# Patient Record
Sex: MALE | Race: WHITE | HISPANIC OR LATINO | Employment: FULL TIME | URBAN - METROPOLITAN AREA
[De-identification: names, ages, dates, MRNs, and addresses within clinical notes are randomized per-mention and may not be internally consistent; named-entity substitution may affect disease eponyms.]

---

## 2023-08-09 ENCOUNTER — HOSPITAL ENCOUNTER (INPATIENT)
Facility: MEDICAL CENTER | Age: 58
LOS: 4 days | DRG: 115 | End: 2023-08-13
Attending: EMERGENCY MEDICINE | Admitting: HOSPITALIST
Payer: COMMERCIAL

## 2023-08-09 ENCOUNTER — APPOINTMENT (OUTPATIENT)
Dept: RADIOLOGY | Facility: MEDICAL CENTER | Age: 58
DRG: 115 | End: 2023-08-09
Attending: HOSPITALIST
Payer: COMMERCIAL

## 2023-08-09 DIAGNOSIS — H54.61 VISION LOSS, RIGHT EYE: ICD-10-CM

## 2023-08-09 PROBLEM — Z79.4 TYPE 2 DIABETES MELLITUS, WITH LONG-TERM CURRENT USE OF INSULIN (HCC): Status: ACTIVE | Noted: 2023-08-09

## 2023-08-09 PROBLEM — I10 ESSENTIAL HYPERTENSION: Status: ACTIVE | Noted: 2023-08-09

## 2023-08-09 PROBLEM — E11.9 TYPE 2 DIABETES MELLITUS, WITH LONG-TERM CURRENT USE OF INSULIN (HCC): Status: ACTIVE | Noted: 2023-08-09

## 2023-08-09 PROBLEM — E78.5 DYSLIPIDEMIA: Status: ACTIVE | Noted: 2023-08-09

## 2023-08-09 LAB
ANION GAP SERPL CALC-SCNC: 13 MMOL/L (ref 7–16)
BASOPHILS # BLD AUTO: 0.4 % (ref 0–1.8)
BASOPHILS # BLD: 0.06 K/UL (ref 0–0.12)
BUN SERPL-MCNC: 15 MG/DL (ref 8–22)
CALCIUM SERPL-MCNC: 10.1 MG/DL (ref 8.5–10.5)
CHLORIDE SERPL-SCNC: 98 MMOL/L (ref 96–112)
CO2 SERPL-SCNC: 22 MMOL/L (ref 20–33)
CREAT SERPL-MCNC: 1.06 MG/DL (ref 0.5–1.4)
CRP SERPL HS-MCNC: 1.61 MG/DL (ref 0–0.75)
EOSINOPHIL # BLD AUTO: 0.3 K/UL (ref 0–0.51)
EOSINOPHIL NFR BLD: 2.2 % (ref 0–6.9)
ERYTHROCYTE [DISTWIDTH] IN BLOOD BY AUTOMATED COUNT: 47.1 FL (ref 35.9–50)
ERYTHROCYTE [SEDIMENTATION RATE] IN BLOOD BY WESTERGREN METHOD: 21 MM/HOUR (ref 0–20)
GFR SERPLBLD CREATININE-BSD FMLA CKD-EPI: 81 ML/MIN/1.73 M 2
GLUCOSE BLD STRIP.AUTO-MCNC: 156 MG/DL (ref 65–99)
GLUCOSE BLD STRIP.AUTO-MCNC: 303 MG/DL (ref 65–99)
GLUCOSE SERPL-MCNC: 111 MG/DL (ref 65–99)
HCT VFR BLD AUTO: 41.8 % (ref 42–52)
HGB BLD-MCNC: 13.5 G/DL (ref 14–18)
IMM GRANULOCYTES # BLD AUTO: 0.13 K/UL (ref 0–0.11)
IMM GRANULOCYTES NFR BLD AUTO: 1 % (ref 0–0.9)
LYMPHOCYTES # BLD AUTO: 2.26 K/UL (ref 1–4.8)
LYMPHOCYTES NFR BLD: 16.9 % (ref 22–41)
MCH RBC QN AUTO: 30.3 PG (ref 27–33)
MCHC RBC AUTO-ENTMCNC: 32.3 G/DL (ref 32.3–36.5)
MCV RBC AUTO: 93.7 FL (ref 81.4–97.8)
MONOCYTES # BLD AUTO: 0.95 K/UL (ref 0–0.85)
MONOCYTES NFR BLD AUTO: 7.1 % (ref 0–13.4)
NEUTROPHILS # BLD AUTO: 9.67 K/UL (ref 1.82–7.42)
NEUTROPHILS NFR BLD: 72.4 % (ref 44–72)
NRBC # BLD AUTO: 0 K/UL
NRBC BLD-RTO: 0 /100 WBC (ref 0–0.2)
PLATELET # BLD AUTO: 387 K/UL (ref 164–446)
PMV BLD AUTO: 10 FL (ref 9–12.9)
POTASSIUM SERPL-SCNC: 4.5 MMOL/L (ref 3.6–5.5)
RBC # BLD AUTO: 4.46 M/UL (ref 4.7–6.1)
SODIUM SERPL-SCNC: 133 MMOL/L (ref 135–145)
WBC # BLD AUTO: 13.4 K/UL (ref 4.8–10.8)

## 2023-08-09 PROCEDURE — 86362 MOG-IGG1 ANTB CBA EACH: CPT

## 2023-08-09 PROCEDURE — 700105 HCHG RX REV CODE 258: Performed by: EMERGENCY MEDICINE

## 2023-08-09 PROCEDURE — 70496 CT ANGIOGRAPHY HEAD: CPT

## 2023-08-09 PROCEDURE — 80048 BASIC METABOLIC PNL TOTAL CA: CPT

## 2023-08-09 PROCEDURE — 96365 THER/PROPH/DIAG IV INF INIT: CPT

## 2023-08-09 PROCEDURE — 99222 1ST HOSP IP/OBS MODERATE 55: CPT | Performed by: PSYCHIATRY & NEUROLOGY

## 2023-08-09 PROCEDURE — 85652 RBC SED RATE AUTOMATED: CPT

## 2023-08-09 PROCEDURE — 85025 COMPLETE CBC W/AUTO DIFF WBC: CPT

## 2023-08-09 PROCEDURE — 86051 AQUAPORIN-4 ANTB ELISA: CPT

## 2023-08-09 PROCEDURE — 700111 HCHG RX REV CODE 636 W/ 250 OVERRIDE (IP): Mod: JZ | Performed by: EMERGENCY MEDICINE

## 2023-08-09 PROCEDURE — 82962 GLUCOSE BLOOD TEST: CPT | Mod: 91

## 2023-08-09 PROCEDURE — 770006 HCHG ROOM/CARE - MED/SURG/GYN SEMI*

## 2023-08-09 PROCEDURE — 70498 CT ANGIOGRAPHY NECK: CPT

## 2023-08-09 PROCEDURE — 700102 HCHG RX REV CODE 250 W/ 637 OVERRIDE(OP): Performed by: HOSPITALIST

## 2023-08-09 PROCEDURE — 99223 1ST HOSP IP/OBS HIGH 75: CPT | Mod: AI | Performed by: HOSPITALIST

## 2023-08-09 PROCEDURE — 700117 HCHG RX CONTRAST REV CODE 255: Performed by: HOSPITALIST

## 2023-08-09 PROCEDURE — 86140 C-REACTIVE PROTEIN: CPT

## 2023-08-09 PROCEDURE — 36415 COLL VENOUS BLD VENIPUNCTURE: CPT

## 2023-08-09 PROCEDURE — 99285 EMERGENCY DEPT VISIT HI MDM: CPT

## 2023-08-09 PROCEDURE — A9270 NON-COVERED ITEM OR SERVICE: HCPCS | Performed by: HOSPITALIST

## 2023-08-09 RX ORDER — BISACODYL 10 MG
10 SUPPOSITORY, RECTAL RECTAL
Status: DISCONTINUED | OUTPATIENT
Start: 2023-08-09 | End: 2023-08-13 | Stop reason: HOSPADM

## 2023-08-09 RX ORDER — PROMETHAZINE HYDROCHLORIDE 25 MG/1
12.5-25 TABLET ORAL EVERY 4 HOURS PRN
Status: DISCONTINUED | OUTPATIENT
Start: 2023-08-09 | End: 2023-08-13 | Stop reason: HOSPADM

## 2023-08-09 RX ORDER — OLMESARTAN MEDOXOMIL 20 MG/1
20 TABLET ORAL NIGHTLY
Status: DISCONTINUED | OUTPATIENT
Start: 2023-08-09 | End: 2023-08-13 | Stop reason: HOSPADM

## 2023-08-09 RX ORDER — OLMESARTAN MEDOXOMIL 20 MG/1
20 TABLET ORAL NIGHTLY
COMMUNITY

## 2023-08-09 RX ORDER — SPIRONOLACTONE 25 MG/1
37.5 TABLET ORAL
Status: DISCONTINUED | OUTPATIENT
Start: 2023-08-10 | End: 2023-08-13 | Stop reason: HOSPADM

## 2023-08-09 RX ORDER — DEXTROSE MONOHYDRATE 25 G/50ML
25 INJECTION, SOLUTION INTRAVENOUS
Status: DISCONTINUED | OUTPATIENT
Start: 2023-08-09 | End: 2023-08-10

## 2023-08-09 RX ORDER — ASPIRIN 81 MG/1
81 TABLET ORAL
COMMUNITY

## 2023-08-09 RX ORDER — INSULIN HUMAN 500 [IU]/ML
INJECTION, SOLUTION SUBCUTANEOUS
Status: ON HOLD | COMMUNITY
Start: 2023-06-12 | End: 2023-08-13

## 2023-08-09 RX ORDER — AMOXICILLIN 250 MG
2 CAPSULE ORAL 2 TIMES DAILY
Status: DISCONTINUED | OUTPATIENT
Start: 2023-08-09 | End: 2023-08-13 | Stop reason: HOSPADM

## 2023-08-09 RX ORDER — ONDANSETRON 4 MG/1
4 TABLET, ORALLY DISINTEGRATING ORAL EVERY 4 HOURS PRN
Status: DISCONTINUED | OUTPATIENT
Start: 2023-08-09 | End: 2023-08-13 | Stop reason: HOSPADM

## 2023-08-09 RX ORDER — ROSUVASTATIN CALCIUM 5 MG/1
5 TABLET, COATED ORAL NIGHTLY
COMMUNITY
Start: 2023-05-22

## 2023-08-09 RX ORDER — INSULIN LISPRO 100 [IU]/ML
3-14 INJECTION, SOLUTION INTRAVENOUS; SUBCUTANEOUS
Status: DISCONTINUED | OUTPATIENT
Start: 2023-08-09 | End: 2023-08-10

## 2023-08-09 RX ORDER — INSULIN LISPRO 100 [IU]/ML
0.2 INJECTION, SOLUTION INTRAVENOUS; SUBCUTANEOUS
Status: DISCONTINUED | OUTPATIENT
Start: 2023-08-10 | End: 2023-08-10

## 2023-08-09 RX ORDER — ASPIRIN 81 MG/1
81 TABLET ORAL DAILY
Status: DISCONTINUED | OUTPATIENT
Start: 2023-08-10 | End: 2023-08-13 | Stop reason: HOSPADM

## 2023-08-09 RX ORDER — POLYETHYLENE GLYCOL 3350 17 G/17G
1 POWDER, FOR SOLUTION ORAL
Status: DISCONTINUED | OUTPATIENT
Start: 2023-08-09 | End: 2023-08-13 | Stop reason: HOSPADM

## 2023-08-09 RX ORDER — PROMETHAZINE HYDROCHLORIDE 25 MG/1
12.5-25 SUPPOSITORY RECTAL EVERY 4 HOURS PRN
Status: DISCONTINUED | OUTPATIENT
Start: 2023-08-09 | End: 2023-08-13 | Stop reason: HOSPADM

## 2023-08-09 RX ORDER — PROCHLORPERAZINE EDISYLATE 5 MG/ML
5-10 INJECTION INTRAMUSCULAR; INTRAVENOUS EVERY 4 HOURS PRN
Status: DISCONTINUED | OUTPATIENT
Start: 2023-08-09 | End: 2023-08-13 | Stop reason: HOSPADM

## 2023-08-09 RX ORDER — METHYLPREDNISOLONE SODIUM SUCCINATE 40 MG/ML
1000 INJECTION, POWDER, LYOPHILIZED, FOR SOLUTION INTRAMUSCULAR; INTRAVENOUS DAILY
Status: DISCONTINUED | OUTPATIENT
Start: 2023-08-10 | End: 2023-08-09

## 2023-08-09 RX ORDER — ACETAMINOPHEN 325 MG/1
650 TABLET ORAL EVERY 6 HOURS PRN
Status: DISCONTINUED | OUTPATIENT
Start: 2023-08-09 | End: 2023-08-13 | Stop reason: HOSPADM

## 2023-08-09 RX ORDER — ROSUVASTATIN CALCIUM 5 MG/1
5 TABLET, COATED ORAL NIGHTLY
Status: DISCONTINUED | OUTPATIENT
Start: 2023-08-09 | End: 2023-08-13 | Stop reason: HOSPADM

## 2023-08-09 RX ORDER — LABETALOL HYDROCHLORIDE 5 MG/ML
10 INJECTION, SOLUTION INTRAVENOUS EVERY 4 HOURS PRN
Status: DISCONTINUED | OUTPATIENT
Start: 2023-08-09 | End: 2023-08-13 | Stop reason: HOSPADM

## 2023-08-09 RX ORDER — CARVEDILOL 25 MG/1
25 TABLET ORAL 2 TIMES DAILY
COMMUNITY

## 2023-08-09 RX ORDER — CARVEDILOL 6.25 MG/1
25 TABLET ORAL 2 TIMES DAILY
Status: DISCONTINUED | OUTPATIENT
Start: 2023-08-09 | End: 2023-08-13 | Stop reason: HOSPADM

## 2023-08-09 RX ORDER — ONDANSETRON 2 MG/ML
4 INJECTION INTRAMUSCULAR; INTRAVENOUS EVERY 4 HOURS PRN
Status: DISCONTINUED | OUTPATIENT
Start: 2023-08-09 | End: 2023-08-13 | Stop reason: HOSPADM

## 2023-08-09 RX ORDER — SPIRONOLACTONE 25 MG/1
37.5 TABLET ORAL
COMMUNITY
Start: 2023-06-27

## 2023-08-09 RX ADMIN — ROSUVASTATIN CALCIUM 5 MG: 5 TABLET, FILM COATED ORAL at 20:39

## 2023-08-09 RX ADMIN — CARVEDILOL 25 MG: 6.25 TABLET, FILM COATED ORAL at 18:06

## 2023-08-09 RX ADMIN — INSULIN LISPRO 10 UNITS: 100 INJECTION, SOLUTION INTRAVENOUS; SUBCUTANEOUS at 20:38

## 2023-08-09 RX ADMIN — SODIUM CHLORIDE 1000 MG: 9 INJECTION, SOLUTION INTRAVENOUS at 15:15

## 2023-08-09 RX ADMIN — IOHEXOL 60 ML: 350 INJECTION, SOLUTION INTRAVENOUS at 22:29

## 2023-08-09 RX ADMIN — INSULIN GLARGINE-YFGN 26 UNITS: 100 INJECTION, SOLUTION SUBCUTANEOUS at 18:04

## 2023-08-09 RX ADMIN — OLMESARTAN MEDOXOMIL 20 MG: 20 TABLET, FILM COATED ORAL at 20:39

## 2023-08-09 ASSESSMENT — COGNITIVE AND FUNCTIONAL STATUS - GENERAL
SUGGESTED CMS G CODE MODIFIER DAILY ACTIVITY: CH
DAILY ACTIVITIY SCORE: 24
SUGGESTED CMS G CODE MODIFIER MOBILITY: CH
MOBILITY SCORE: 24

## 2023-08-09 ASSESSMENT — LIFESTYLE VARIABLES
ON A TYPICAL DAY WHEN YOU DRINK ALCOHOL HOW MANY DRINKS DO YOU HAVE: 1
CONSUMPTION TOTAL: NEGATIVE
EVER FELT BAD OR GUILTY ABOUT YOUR DRINKING: NO
ALCOHOL_USE: YES
TOTAL SCORE: 0
EVER HAD A DRINK FIRST THING IN THE MORNING TO STEADY YOUR NERVES TO GET RID OF A HANGOVER: NO
AVERAGE NUMBER OF DAYS PER WEEK YOU HAVE A DRINK CONTAINING ALCOHOL: 5
HAVE PEOPLE ANNOYED YOU BY CRITICIZING YOUR DRINKING: NO
HAVE YOU EVER FELT YOU SHOULD CUT DOWN ON YOUR DRINKING: NO
TOTAL SCORE: 0
HOW MANY TIMES IN THE PAST YEAR HAVE YOU HAD 5 OR MORE DRINKS IN A DAY: 0
DOES PATIENT WANT TO STOP DRINKING: NO
TOTAL SCORE: 0

## 2023-08-09 ASSESSMENT — PAIN DESCRIPTION - PAIN TYPE
TYPE: ACUTE PAIN
TYPE: ACUTE PAIN

## 2023-08-09 ASSESSMENT — PATIENT HEALTH QUESTIONNAIRE - PHQ9
SUM OF ALL RESPONSES TO PHQ9 QUESTIONS 1 AND 2: 0
1. LITTLE INTEREST OR PLEASURE IN DOING THINGS: NOT AT ALL
2. FEELING DOWN, DEPRESSED, IRRITABLE, OR HOPELESS: NOT AT ALL

## 2023-08-09 ASSESSMENT — FIBROSIS 4 INDEX
FIB4 SCORE: 0.79
FIB4 SCORE: 0.67

## 2023-08-09 NOTE — ED TRIAGE NOTES
Chief Complaint   Patient presents with    Loss of Vision     Complete loss of vision to R eye since 7/27/23.  Pt sent to ED by eye doctor

## 2023-08-09 NOTE — ED PROVIDER NOTES
ER Provider Note    Scribed for Young Najera M.d. by Maurilio Romo M.D.. 8/9/2023  1:59 PM    Primary Care Provider: Pcp Pt States None    CHIEF COMPLAINT  Chief Complaint   Patient presents with    Loss of Vision     Complete loss of vision to R eye since 7/27/23.  Pt sent to ED by eye doctor     EXTERNAL RECORDS REVIEWED  Outside hospital records show the patient was seen in a different emergency department on July 29, for right-sided vision loss, and referred to a retinal specialist, who wanted him to have an MRI within 7 days.  On August 1, he represented to the emergency department, believing he was told to go there for stat imaging.  This was negative for optic neuritis, but there was a finding of a white matter lesion measuring 1.2 x 0.8 x 1.2 cm in the mid frontal lobe.  This raise concern for chronic demyelinating plaque.  This triggered a referral to neurology.  A review of the August 1 emergency department visit confirms a neurology referral.  Emergency department visit on August 1 shows that the provider consulted the on-call neurologist, reviewed the MRI findings, and neurology recommended referral to the outpatient clinic, Dr. Ferrera.  The emergency department provider spoke again with [facility], who wanted to see the patient the following morning, August 2, at 8 AM.  There is also a documented primary care follow-up visit on August 3. He also has an order for outpatient carotid Doppler.  ESR and CRP were ordered as outpatient studies as well.  I do not see that the studies have resulted.    HPI/ROS  LIMITATION TO HISTORY       OUTSIDE HISTORIAN(S):  Spouse at bedside contributes to history.    Ace Curry is a 58 y.o. male who presents to the ED complaining of R eye vision loss onset almost 2 weeks ago, on July 27. He says that he was seen again today by Dr. Torres from [facility], and says he was told to come here to get a carotid Doppler and a temporal  artery biopsy.  He denies trauma, fevers or chills, chest pain or cough.  He reports a history of diabetes, hypertension, hyperlipidemia.  BMI is elevated.  He certainly has stroke risk factors.  He says that late last month, he had some slight haziness in his right eye, which has been slowly progressive, then more rapidly progressive over the past 24 to 48 hours, and in combination with his evaluation today, resulted in his emergent referral to the emergency department, per his report.  He does not complain of confusion, sensation changes, weakness, ataxia, or any febrile or respiratory illness.  No prior similar vision issues.      PAST MEDICAL HISTORY  History reviewed. No pertinent past medical history.    SURGICAL HISTORY  No past surgical history on file.    FAMILY HISTORY  No family history on file.    SOCIAL HISTORY       CURRENT MEDICATIONS  Current Discharge Medication List        CONTINUE these medications which have NOT CHANGED    Details   spironolactone (ALDACTONE) 25 MG Tab Take 37.5 mg by mouth every day. 37.5mg = 1 and 1/2 tab      rosuvastatin (CRESTOR) 5 MG Tab Take 5 mg by mouth every evening.      Insulin Regular Human, Conc, (HUMULIN R U-500 KWIKPEN) 500 UNIT/ML Solution Pen-injector INJECT  UNITS AS PER SLIDING SCALE SUBCUTANEOUSLY 2 TIMES DAILY BEFORE MEALS FOR  UNITS PER DAY      Cyanocobalamin (VITAMIN B 12 PO) Take 1 Tablet by mouth every day.      VITAMIN D PO Take 1 Tablet by mouth every day.      MAGNESIUM PO Take 1 Tablet by mouth every day.      NON SPECIFIED Take 1 Tablet by mouth every day. Emu- helps with inflamation      aspirin (ASPIRIN 81) 81 MG EC tablet Take 81 mg by mouth every day.      carvedilol (COREG) 25 MG Tab Take 25 mg by mouth 2 times a day.      olmesartan (BENICAR) 20 MG Tab Take 20 mg by mouth every evening.             ALLERGIES  Patient has no known allergies.    PHYSICAL EXAM  VITAL SIGNS: /61   Pulse 69   Temp 36.2 °C (97.1 °F) (Temporal)   " Resp 17   Ht 1.88 m (6' 2\")   Wt (!) 132 kg (290 lb 9.1 oz)   SpO2 93%   BMI 37.31 kg/m²   Pulse ox interpretation: I interpret this pulse ox as normal.  Constitutional: Alert in no apparent distress.  HENT: No signs of trauma, Bilateral external ears normal, Nose normal.   Eyes: Conjunctiva normal, Non-icteric.   Neck: Normal range of motion, Supple, No stridor.   Lymphatic: No lymphadenopathy noted.   Cardiovascular: Regular rate and rhythm, no murmurs.   Thorax & Lungs: Normal breath sounds, No respiratory distress, No wheezing  Abdomen: Bowel sounds normal, Soft, No tenderness, No masses, No pulsatile masses. No peritoneal signs.  Skin: Warm, Dry, No erythema, No rash.   Extremities: Intact distal pulses, No edema, No cyanosis.  Musculoskeletal: Good range of motion in all major joints. No or major deformities noted.   Neurologic: Alert , Normal motor function, Normal sensory function, No focal deficits noted.   Psychiatric: Affect normal, Judgment normal, Mood normal.     DIAGNOSTIC STUDIES    Labs:   Labs Reviewed   CBC WITH DIFFERENTIAL - Abnormal; Notable for the following components:       Result Value    WBC 13.4 (*)     RBC 4.46 (*)     Hemoglobin 13.5 (*)     Hematocrit 41.8 (*)     Neutrophils-Polys 72.40 (*)     Lymphocytes 16.90 (*)     Immature Granulocytes 1.00 (*)     Neutrophils (Absolute) 9.67 (*)     Monos (Absolute) 0.95 (*)     Immature Granulocytes (abs) 0.13 (*)     All other components within normal limits   BASIC METABOLIC PANEL - Abnormal; Notable for the following components:    Sodium 133 (*)     Glucose 111 (*)     All other components within normal limits   SED RATE - Abnormal; Notable for the following components:    Sed Rate Westergren 21 (*)     All other components within normal limits   CRP QUANTITIVE (NON-CARDIAC) - Abnormal; Notable for the following components:    Stat C-Reactive Protein 1.61 (*)     All other components within normal limits   CBC WITH DIFFERENTIAL - " Abnormal; Notable for the following components:    WBC 14.6 (*)     RBC 4.49 (*)     Hematocrit 41.2 (*)     Neutrophils-Polys 89.50 (*)     Lymphocytes 8.60 (*)     Immature Granulocytes 1.20 (*)     Neutrophils (Absolute) 13.04 (*)     Immature Granulocytes (abs) 0.18 (*)     All other components within normal limits   BASIC METABOLIC PANEL - Abnormal; Notable for the following components:    Sodium 129 (*)     Co2 16 (*)     Glucose 288 (*)     Anion Gap 17.0 (*)     All other components within normal limits   BASIC METABOLIC PANEL - Abnormal; Notable for the following components:    Sodium 130 (*)     Co2 17 (*)     Glucose 378 (*)     Bun 27 (*)     Anion Gap 17.0 (*)     All other components within normal limits   CBC WITH DIFFERENTIAL - Abnormal; Notable for the following components:    WBC 18.8 (*)     RBC 4.39 (*)     Hemoglobin 13.4 (*)     Hematocrit 40.7 (*)     Neutrophils-Polys 88.50 (*)     Lymphocytes 6.40 (*)     Immature Granulocytes 1.50 (*)     Neutrophils (Absolute) 16.67 (*)     Immature Granulocytes (abs) 0.28 (*)     All other components within normal limits   BASIC METABOLIC PANEL - Abnormal; Notable for the following components:    Sodium 132 (*)     Co2 18 (*)     Glucose 376 (*)     Bun 28 (*)     All other components within normal limits   POCT GLUCOSE DEVICE RESULTS - Abnormal; Notable for the following components:    POC Glucose, Blood 156 (*)     All other components within normal limits   POCT GLUCOSE DEVICE RESULTS - Abnormal; Notable for the following components:    POC Glucose, Blood 303 (*)     All other components within normal limits   POCT GLUCOSE DEVICE RESULTS - Abnormal; Notable for the following components:    POC Glucose, Blood 307 (*)     All other components within normal limits   POCT GLUCOSE DEVICE RESULTS - Abnormal; Notable for the following components:    POC Glucose, Blood 331 (*)     All other components within normal limits   POCT GLUCOSE DEVICE RESULTS -  Abnormal; Notable for the following components:    POC Glucose, Blood 324 (*)     All other components within normal limits   POCT GLUCOSE DEVICE RESULTS - Abnormal; Notable for the following components:    POC Glucose, Blood 389 (*)     All other components within normal limits   POCT GLUCOSE DEVICE RESULTS - Abnormal; Notable for the following components:    POC Glucose, Blood 403 (*)     All other components within normal limits   POCT GLUCOSE DEVICE RESULTS - Abnormal; Notable for the following components:    POC Glucose, Blood 364 (*)     All other components within normal limits   POCT GLUCOSE DEVICE RESULTS - Abnormal; Notable for the following components:    POC Glucose, Blood 377 (*)     All other components within normal limits   ESTIMATED GFR   ESTIMATED GFR   BETA-HYDROXYBUTYRIC ACID   PROTHROMBIN TIME    Narrative:     Indicate which anticoagulants the patient is on:->NONE   APTT    Narrative:     Indicate which anticoagulants the patient is on:->NONE   ESTIMATED GFR   ESTIMATED GFR   MOG IGG W/RFLX TITER,SERUM   AQUAPORIN-4 RECEPTOR AB   CSF CELL COUNT   MENINGITIS/ENCEPHALITIS CSF PANEL BY PCR   CSF GLUCOSE   CSF PROTEIN   CSF CULTURE   OLIGOCLONAL BANDS SERUM/CSF   PROTEIN ELECTROPHORESIS CSF   TREPONEMA PALLIDUM ANTIBODY, IGG BY IFA (CSF)   SED RATE       EKG:   I have independently interpreted this EKG  No results found for this or any previous visit.    Radiology:   The attending emergency physician has independently interpreted the diagnostic imaging associated with this visit and am waiting the final reading from the radiologist.   Preliminary interpretation is a follows: Outside hospital CT imaging reports reviewed, and are unremarkable.  Radiologist interpretation:     US-TEMPORAL ARTERY DUPLEX BILAT   Final Result      CT-CTA NECK WITH & W/O-POST PROCESSING   Final Result      CT angiogram of the neck within normal limits.      CT-CTA HEAD WITH & W/O-POST PROCESS   Final Result      CT  angiogram of the [place] within normal limits.      MR-BRAIN-WITH & W/O    (Results Pending)       COURSE & MEDICAL DECISION MAKING     ED Observation Status? No; Patient does not meet criteria for ED Observation.     INITIAL ASSESSMENT, COURSE AND PLAN  Care Narrative:       1:59 PM Patient presents to the ED with progressive and then precipitous vision loss in the right eye.  He has had extensive imaging, with no obvious findings except for some concerning demyelinating lesions.  This was on an MRI of the orbits, but no dedicated MRI of the head has been performed.  He says he was referred directly to the emergency department from [facility], so I will try to contact them for collateral information.    2:27 PM Jacques Torres at [facility] reports to me during our phone conversation that he spoke with Dr. Gomez, neuro-ophthalmology here at [facility], who recommend the patient come to the emergency department.  This did not end up going through the transfer center, so no other information was available until our phone conversation.  We discussed the concerns about the broad differential for this patient, including optic neuritis, ischemic optic neuritis, demyelinating lesions, giant cell arteritis, CVA.  I will speak with Dr. Keith juárez, if possible for collateral.    2:48 PM Dr. Guerra manager, neuro-ophthalmology, returned my page and we discussed the patient's imaging to date, his conversations with [facility] its, and his recommendation for admission for general neurology consult to broaden the differential, but also that we initiate high-dose steroid therapy in case of giant cell arteritis.  I have submitted a bed request.  Of also ordered a gram of Solu-Medrol.    3:16 PM Dr. Bruno Gomez, the hospitalist, came to discuss the patient with me.  We reviewed the somewhat complicated course so far, beginning with the outside emergency department evaluations, as well as  [facility] evaluations, and Dr. Calvert's recommendations.  The patient will be admitted to the hospitalist service.      ADDITIONAL PROBLEM LIST  Diabetes, hypertension, hyperlipidemia.    DISPOSITION AND DISCUSSIONS  I have discussed management of the patient with the following physicians and SEDA's: The APRN at [facility], Dr. Calvert from neuro-ophthalmology, and the admitting hospitalist, as above.    DISPOSITION:  Patient will be admitted to the hospitalist service in guarded condition.      FINAL DIAGNOSIS  1. Vision loss, right eye      CRITICAL CARE  The very real possibilty of a deterioration of this patient's condition required the highest level of my preparedness for sudden, emergent intervention.  I provided critical care services, which included medication orders, frequent reevaluations of the patient's condition and response to treatment, ordering and reviewing test results, and discussing the case with various consultants.  The critical care time associated with the care of the patient was 39 minutes. Review chart for interventions. This time is exclusive of any other billable procedures.         IMaurilio M.D. (Mignon), am scribing for, and in the presence of, Maurilio Romo M.D..    Electronically signed by: Maurilio Romo M.D. (Duyibe), 8/9/2023    Maurilio SPENCER M.D. personally performed the services described in this documentation, as scribed by Maurilio Romo M.D. in my presence, and it is both accurate and complete.

## 2023-08-09 NOTE — PROGRESS NOTES
Pt transferred to [facility] with transport via gurney. Upon arrival pt ambulated to bathroom with no assistance and no complications. Patient and wife informed on visitor policy. Both parties agreeable on POC.

## 2023-08-09 NOTE — ED NOTES
Eye Procedures OS (Left Eye): 20/50  OD (Right Eye): greater than 20/200  OU (Both Eyes): 20/50

## 2023-08-09 NOTE — ED NOTES
Med Rec complete per patient and med list at bedside   Allergies reviewed  No oral antibiotics in the last 30 days  Preferred pharmacy: [facility] in [place]    Ozempic 1 mg 1x/week was last injected 10 days ago and pt states his eye doctor advised him to stop taking it because of concerns pt's symptom could be affected by medication

## 2023-08-10 ENCOUNTER — APPOINTMENT (OUTPATIENT)
Dept: RADIOLOGY | Facility: MEDICAL CENTER | Age: 58
DRG: 115 | End: 2023-08-10
Attending: HOSPITALIST
Payer: COMMERCIAL

## 2023-08-10 LAB
ANION GAP SERPL CALC-SCNC: 17 MMOL/L (ref 7–16)
ANION GAP SERPL CALC-SCNC: 17 MMOL/L (ref 7–16)
APTT PPP: 28.6 SEC (ref 24.7–36)
B-OH-BUTYR SERPL-MCNC: 0.17 MMOL/L (ref 0.02–0.27)
BASOPHILS # BLD AUTO: 0.1 % (ref 0–1.8)
BASOPHILS # BLD: 0.02 K/UL (ref 0–0.12)
BUN SERPL-MCNC: 20 MG/DL (ref 8–22)
BUN SERPL-MCNC: 27 MG/DL (ref 8–22)
CALCIUM SERPL-MCNC: 9.8 MG/DL (ref 8.5–10.5)
CALCIUM SERPL-MCNC: 9.9 MG/DL (ref 8.5–10.5)
CHLORIDE SERPL-SCNC: 96 MMOL/L (ref 96–112)
CHLORIDE SERPL-SCNC: 96 MMOL/L (ref 96–112)
CO2 SERPL-SCNC: 16 MMOL/L (ref 20–33)
CO2 SERPL-SCNC: 17 MMOL/L (ref 20–33)
CREAT SERPL-MCNC: 1.24 MG/DL (ref 0.5–1.4)
CREAT SERPL-MCNC: 1.29 MG/DL (ref 0.5–1.4)
EOSINOPHIL # BLD AUTO: 0.01 K/UL (ref 0–0.51)
EOSINOPHIL NFR BLD: 0.1 % (ref 0–6.9)
ERYTHROCYTE [DISTWIDTH] IN BLOOD BY AUTOMATED COUNT: 45.9 FL (ref 35.9–50)
GFR SERPLBLD CREATININE-BSD FMLA CKD-EPI: 64 ML/MIN/1.73 M 2
GFR SERPLBLD CREATININE-BSD FMLA CKD-EPI: 67 ML/MIN/1.73 M 2
GLUCOSE BLD STRIP.AUTO-MCNC: 307 MG/DL (ref 65–99)
GLUCOSE BLD STRIP.AUTO-MCNC: 324 MG/DL (ref 65–99)
GLUCOSE BLD STRIP.AUTO-MCNC: 331 MG/DL (ref 65–99)
GLUCOSE BLD STRIP.AUTO-MCNC: 389 MG/DL (ref 65–99)
GLUCOSE BLD STRIP.AUTO-MCNC: 403 MG/DL (ref 65–99)
GLUCOSE SERPL-MCNC: 288 MG/DL (ref 65–99)
GLUCOSE SERPL-MCNC: 378 MG/DL (ref 65–99)
HCT VFR BLD AUTO: 41.2 % (ref 42–52)
HGB BLD-MCNC: 14 G/DL (ref 14–18)
IMM GRANULOCYTES # BLD AUTO: 0.18 K/UL (ref 0–0.11)
IMM GRANULOCYTES NFR BLD AUTO: 1.2 % (ref 0–0.9)
INR PPP: 1.08 (ref 0.87–1.13)
LYMPHOCYTES # BLD AUTO: 1.26 K/UL (ref 1–4.8)
LYMPHOCYTES NFR BLD: 8.6 % (ref 22–41)
MCH RBC QN AUTO: 31.2 PG (ref 27–33)
MCHC RBC AUTO-ENTMCNC: 34 G/DL (ref 32.3–36.5)
MCV RBC AUTO: 91.8 FL (ref 81.4–97.8)
MONOCYTES # BLD AUTO: 0.07 K/UL (ref 0–0.85)
MONOCYTES NFR BLD AUTO: 0.5 % (ref 0–13.4)
NEUTROPHILS # BLD AUTO: 13.04 K/UL (ref 1.82–7.42)
NEUTROPHILS NFR BLD: 89.5 % (ref 44–72)
NRBC # BLD AUTO: 0 K/UL
NRBC BLD-RTO: 0 /100 WBC (ref 0–0.2)
PLATELET # BLD AUTO: 387 K/UL (ref 164–446)
PMV BLD AUTO: 10.1 FL (ref 9–12.9)
POTASSIUM SERPL-SCNC: 4.6 MMOL/L (ref 3.6–5.5)
POTASSIUM SERPL-SCNC: 5.1 MMOL/L (ref 3.6–5.5)
PROTHROMBIN TIME: 13.9 SEC (ref 12–14.6)
RBC # BLD AUTO: 4.49 M/UL (ref 4.7–6.1)
SODIUM SERPL-SCNC: 129 MMOL/L (ref 135–145)
SODIUM SERPL-SCNC: 130 MMOL/L (ref 135–145)
WBC # BLD AUTO: 14.6 K/UL (ref 4.8–10.8)

## 2023-08-10 PROCEDURE — 82962 GLUCOSE BLOOD TEST: CPT | Mod: 91

## 2023-08-10 PROCEDURE — 36415 COLL VENOUS BLD VENIPUNCTURE: CPT

## 2023-08-10 PROCEDURE — 700105 HCHG RX REV CODE 258: Performed by: HOSPITALIST

## 2023-08-10 PROCEDURE — 80048 BASIC METABOLIC PNL TOTAL CA: CPT

## 2023-08-10 PROCEDURE — 85730 THROMBOPLASTIN TIME PARTIAL: CPT

## 2023-08-10 PROCEDURE — 93880 EXTRACRANIAL BILAT STUDY: CPT | Mod: 26 | Performed by: INTERNAL MEDICINE

## 2023-08-10 PROCEDURE — 770006 HCHG ROOM/CARE - MED/SURG/GYN SEMI*

## 2023-08-10 PROCEDURE — 700102 HCHG RX REV CODE 250 W/ 637 OVERRIDE(OP): Performed by: HOSPITALIST

## 2023-08-10 PROCEDURE — 85025 COMPLETE CBC W/AUTO DIFF WBC: CPT

## 2023-08-10 PROCEDURE — 700111 HCHG RX REV CODE 636 W/ 250 OVERRIDE (IP): Mod: JZ | Performed by: HOSPITALIST

## 2023-08-10 PROCEDURE — 93880 EXTRACRANIAL BILAT STUDY: CPT

## 2023-08-10 PROCEDURE — 85610 PROTHROMBIN TIME: CPT

## 2023-08-10 PROCEDURE — 83916 OLIGOCLONAL BANDS: CPT

## 2023-08-10 PROCEDURE — 82010 KETONE BODYS QUAN: CPT

## 2023-08-10 PROCEDURE — 99233 SBSQ HOSP IP/OBS HIGH 50: CPT | Mod: 25 | Performed by: INTERNAL MEDICINE

## 2023-08-10 PROCEDURE — A9270 NON-COVERED ITEM OR SERVICE: HCPCS | Performed by: HOSPITALIST

## 2023-08-10 RX ORDER — INSULIN LISPRO 100 [IU]/ML
3-14 INJECTION, SOLUTION INTRAVENOUS; SUBCUTANEOUS
Status: DISCONTINUED | OUTPATIENT
Start: 2023-08-11 | End: 2023-08-11

## 2023-08-10 RX ORDER — INSULIN LISPRO 100 [IU]/ML
3-14 INJECTION, SOLUTION INTRAVENOUS; SUBCUTANEOUS
Status: DISCONTINUED | OUTPATIENT
Start: 2023-08-10 | End: 2023-08-10

## 2023-08-10 RX ORDER — INSULIN LISPRO 100 [IU]/ML
20 INJECTION, SOLUTION INTRAVENOUS; SUBCUTANEOUS
Status: DISCONTINUED | OUTPATIENT
Start: 2023-08-11 | End: 2023-08-11

## 2023-08-10 RX ORDER — DEXTROSE MONOHYDRATE 25 G/50ML
25 INJECTION, SOLUTION INTRAVENOUS
Status: DISCONTINUED | OUTPATIENT
Start: 2023-08-10 | End: 2023-08-11

## 2023-08-10 RX ORDER — INSULIN LISPRO 100 [IU]/ML
14 INJECTION, SOLUTION INTRAVENOUS; SUBCUTANEOUS
Status: DISCONTINUED | OUTPATIENT
Start: 2023-08-10 | End: 2023-08-10

## 2023-08-10 RX ORDER — INSULIN LISPRO 100 [IU]/ML
8 INJECTION, SOLUTION INTRAVENOUS; SUBCUTANEOUS ONCE
Status: COMPLETED | OUTPATIENT
Start: 2023-08-10 | End: 2023-08-10

## 2023-08-10 RX ORDER — DEXTROSE MONOHYDRATE 25 G/50ML
25 INJECTION, SOLUTION INTRAVENOUS
Status: DISCONTINUED | OUTPATIENT
Start: 2023-08-10 | End: 2023-08-10

## 2023-08-10 RX ADMIN — ROSUVASTATIN CALCIUM 5 MG: 5 TABLET, FILM COATED ORAL at 21:38

## 2023-08-10 RX ADMIN — OLMESARTAN MEDOXOMIL 20 MG: 20 TABLET, FILM COATED ORAL at 21:38

## 2023-08-10 RX ADMIN — INSULIN LISPRO 8 UNITS: 100 INJECTION, SOLUTION INTRAVENOUS; SUBCUTANEOUS at 20:13

## 2023-08-10 RX ADMIN — ASPIRIN 81 MG: 81 TABLET, COATED ORAL at 05:34

## 2023-08-10 RX ADMIN — INSULIN LISPRO 10 UNITS: 100 INJECTION, SOLUTION INTRAVENOUS; SUBCUTANEOUS at 07:41

## 2023-08-10 RX ADMIN — CARVEDILOL 25 MG: 6.25 TABLET, FILM COATED ORAL at 05:34

## 2023-08-10 RX ADMIN — INSULIN LISPRO 10 UNITS: 100 INJECTION, SOLUTION INTRAVENOUS; SUBCUTANEOUS at 12:24

## 2023-08-10 RX ADMIN — INSULIN LISPRO 14 UNITS: 100 INJECTION, SOLUTION INTRAVENOUS; SUBCUTANEOUS at 21:43

## 2023-08-10 RX ADMIN — INSULIN LISPRO 14 UNITS: 100 INJECTION, SOLUTION INTRAVENOUS; SUBCUTANEOUS at 18:08

## 2023-08-10 RX ADMIN — INSULIN LISPRO 10 UNITS: 100 INJECTION, SOLUTION INTRAVENOUS; SUBCUTANEOUS at 18:09

## 2023-08-10 RX ADMIN — CARVEDILOL 25 MG: 6.25 TABLET, FILM COATED ORAL at 16:20

## 2023-08-10 RX ADMIN — INSULIN LISPRO 14 UNITS: 100 INJECTION, SOLUTION INTRAVENOUS; SUBCUTANEOUS at 12:24

## 2023-08-10 RX ADMIN — SODIUM CHLORIDE 1000 MG: 9 INJECTION, SOLUTION INTRAVENOUS at 05:48

## 2023-08-10 RX ADMIN — INSULIN LISPRO 9 UNITS: 100 INJECTION, SOLUTION INTRAVENOUS; SUBCUTANEOUS at 07:41

## 2023-08-10 RX ADMIN — SPIRONOLACTONE 37.5 MG: 25 TABLET ORAL at 05:34

## 2023-08-10 ASSESSMENT — ENCOUNTER SYMPTOMS
FOCAL WEAKNESS: 0
HEADACHES: 0
BLURRED VISION: 1
SHORTNESS OF BREATH: 0
ABDOMINAL PAIN: 0
EYE PAIN: 1

## 2023-08-10 ASSESSMENT — LIFESTYLE VARIABLES: SUBSTANCE_ABUSE: 0

## 2023-08-10 ASSESSMENT — PAIN DESCRIPTION - PAIN TYPE: TYPE: ACUTE PAIN

## 2023-08-10 NOTE — CARE PLAN
The patient is Stable - Low risk of patient condition declining or worsening    Shift Goals  Clinical Goals: MRI, Na+ levels stabilize  Patient Goals: Perform necessary tests  Family Goals: PINO    Progress made toward(s) clinical / shift goals:  Pt to be NPO at [facility] for biopsy tomorrow. Hold daily ASA for LP.       Problem: Psychosocial  Goal: Patient's level of anxiety will decrease  Outcome: Progressing     Problem: Communication  Goal: The ability to communicate needs accurately and effectively will improve  Outcome: Progressing     Problem: Mobility  Goal: Patient's capacity to carry out activities will improve  Outcome: Progressing     Problem: Self Care  Goal: Patient will have the ability to perform ADLs independently or with assistance (bathe, groom, dress, toilet and feed)  Outcome: Progressing     Problem: Fall Risk  Goal: Patient will remain free from falls  Outcome: Progressing       Patient is not progressing towards the following goals:

## 2023-08-10 NOTE — ASSESSMENT & PLAN NOTE
Unclear etiology  outpatient workup ACE level Borrelia serology Toxoplasma antibodies Bartonella as antibodies QuantiFERON treponema antibodies were all negative  MRI of the orbits on 8/1/2023 revealed no convincing evidence of optic neuritis   Neurology consulted, CTA head and neck was negative for significant occlusion.  MRI brain showed area of demyelination, I discussed with Dr. Maldonado who will review the images.  ESR was previously 44 with concerns for temporal arteritis.  Had temporal artery biopsy today, f/u path  I spoke with Dr. Gomez, additional IV Solu-Medrol today then plan for prednisone1 mg/kg/day and he will follow-up with an appointment on Monday at 7:30 AM in clinic  Bedside lumbar puncture unsuccessful, awaiting for radiology assistance for LP  MOG is negative and aquaporin is pending

## 2023-08-10 NOTE — ASSESSMENT & PLAN NOTE
Uncontrolled, glucose 300s  Increase mealtime insulin to 40 units with high ISS  Increase long-acting to 30U BID  Followed by Dr. Gibbons with endocrinology

## 2023-08-10 NOTE — H&P
Hospital Medicine History & Physical Note    Date of Service  8/9/2023    Primary Care Physician  Pcp Pt States None    Consultants  neurology    Specialist Names: Dr Maldonado    Code Status  Full Code    Chief Complaint  Chief Complaint   Patient presents with    Loss of Vision     Complete loss of vision to R eye since 7/27/23.  Pt sent to ED by eye doctor       History of Presenting Illness  Ace Curry is a 58 y.o. male who presented 8/9/2023 with right vision loss.  Patient was at his baseline until around 2 weeks ago when he started to have progressive decrease in vision in his right eye he was evaluated by his ophthalmologist and directed to the emergency department for concern for possible optic neuritis .at the outlying facility and has had an extensive workup including MRI of the orbits which was negative for optic neuritis raise the concern of possible chronic demyelinating plaque in the frontal lobe.  His symptoms progressively got worse he was evaluated today by retina specialist who discussed with neuro-ophthalmology and directed the patient to the emergency department for evaluation for concern for possible temporal arteritis.  Patient reports having neck pain few days ago which she believed was musculoskeletal and has since improved.  He denies any headache jaw or tongue claudications.  He denies any loss of consciousness.  No changes in speech or focal weakness or numbness.  He has longstanding history of type 2 diabetes.  Recent workup revealed a sed rate of 44 CRP that was normal.      I discussed the plan of care with patient, family, and ED physician .    Review of Systems  Review of Systems   All other systems reviewed and are negative.      Past Medical History  Type 2 diabetes hypertension    Surgical History  Reviewed and not pertinent to the presenting problem    Family History  family history is not on file.   Family history reviewed with patient. There is no family history that  is pertinent to the chief complaint.     Social History   He does not smoke no alcohol or drug use    Allergies  Allergies   Allergen Reactions    Farxiga [Dapagliflozin] Unspecified     Ketoacidosis        Medications  Prior to Admission Medications   Prescriptions Last Dose Informant Patient Reported? Taking?   Cyanocobalamin (VITAMIN B 12 PO) 8/8/2023 at afternoon Patient Yes Yes   Sig: Take 1 Tablet by mouth every day.   Insulin Regular Human, Conc, (HUMULIN R U-500 KWIKPEN) 500 UNIT/ML Solution Pen-injector 8/9/2023 at am Patient Yes Yes   Sig: INJECT  UNITS AS PER SLIDING SCALE SUBCUTANEOUSLY 2 TIMES DAILY BEFORE MEALS FOR  UNITS PER DAY   MAGNESIUM PO 8/8/2023 at afternoon Patient Yes Yes   Sig: Take 1 Tablet by mouth every day.   NON SPECIFIED 8/8/2023 at afternoon Patient Yes Yes   Sig: Take 1 Tablet by mouth every day. Emu- helps with inflamation   VITAMIN D PO 8/8/2023 at afternoon Patient Yes Yes   Sig: Take 1 Tablet by mouth every day.   aspirin (ASPIRIN 81) 81 MG EC tablet 8/9/2023 at am Patient Yes No   Sig: Take 81 mg by mouth every day.   carvedilol (COREG) 25 MG Tab 8/9/2023 at am Patient Yes No   Sig: Take 25 mg by mouth 2 times a day.   olmesartan (BENICAR) 20 MG Tab 8/8/2023 Patient Yes No   Sig: Take 20 mg by mouth every evening.   rosuvastatin (CRESTOR) 5 MG Tab 8/8/2023 at pm Patient Yes Yes   Sig: Take 5 mg by mouth every evening.   spironolactone (ALDACTONE) 25 MG Tab 8/9/2023 at am Patient Yes Yes   Sig: Take 37.5 mg by mouth every day. 37.5mg = 1 and 1/2 tab      Facility-Administered Medications: None       Physical Exam  Temp:  [36.1 °C (96.9 °F)-36.3 °C (97.3 °F)] 36.1 °C (96.9 °F)  Pulse:  [62-66] 66  Resp:  [16-20] 18  BP: (131-156)/(61-79) 142/76  SpO2:  [94 %-97 %] 97 %  Blood Pressure: (!) 142/76   Temperature: 36.1 °C (96.9 °F)   Pulse: 66   Respiration: 18   Pulse Oximetry: 97 %       Physical Exam  Vitals and nursing note reviewed.   Constitutional:        Appearance: He is well-developed. He is obese. He is not diaphoretic.   HENT:      Head: Normocephalic and atraumatic.      Mouth/Throat:      Pharynx: No oropharyngeal exudate.   Eyes:      General: No scleral icterus.        Right eye: No discharge.         Left eye: No discharge.      Conjunctiva/sclera: Conjunctivae normal.      Comments: Patient had recent pupillary dilation at his eye exam   Neck:      Vascular: No JVD.      Trachea: No tracheal deviation.   Cardiovascular:      Rate and Rhythm: Normal rate and regular rhythm.      Heart sounds: No murmur heard.     No friction rub. No gallop.   Pulmonary:      Effort: Pulmonary effort is normal. No respiratory distress.      Breath sounds: Normal breath sounds. No stridor. No wheezing.   Chest:      Chest wall: No tenderness.   Abdominal:      General: Bowel sounds are normal. There is no distension.      Palpations: Abdomen is soft.      Tenderness: There is no abdominal tenderness. There is no rebound.   Musculoskeletal:         General: No tenderness.      Cervical back: Neck supple.   Skin:     General: Skin is warm and dry.      Nails: There is no clubbing.   Neurological:      Mental Status: He is alert and oriented to person, place, and time.      Cranial Nerves: Cranial nerve deficit present.      Motor: No abnormal muscle tone.      Comments: Significantly decreased vision in right eye can only see hand movement   Psychiatric:         Behavior: Behavior normal.         Laboratory:  Recent Labs     08/09/23  1513   WBC 13.4*   RBC 4.46*   HEMOGLOBIN 13.5*   HEMATOCRIT 41.8*   MCV 93.7   MCH 30.3   MCHC 32.3   RDW 47.1   PLATELETCT 387   MPV 10.0     Recent Labs     08/09/23  1513   SODIUM 133*   POTASSIUM 4.5   CHLORIDE 98   CO2 22   GLUCOSE 111*   BUN 15   CREATININE 1.06   CALCIUM 10.1     Recent Labs     08/09/23  1513   GLUCOSE 111*         No results for input(s): NTPROBNP in the last 72 hours.      No results for input(s): TROPONINT in the last  72 hours.    Imaging:  CT-CTA HEAD WITH & W/O-POST PROCESS    (Results Pending)   CT-CTA NECK WITH & W/O-POST PROCESSING    (Results Pending)   US-TEMPORAL ARTERY DUPLEX BILAT    (Results Pending)            Assessment/Plan:  Justification for Admission Status  I anticipate this patient will require at least two midnights for appropriate medical management, necessitating inpatient admission because acute vision loss    Patient will need a Med/Surg bed on NEUROLOGY service .  The need is secondary to vision loss.    * Vision loss of right eye- (present on admission)  Assessment & Plan  Etiology is not clear    I reviewedI reviewed outpatient workup ACE level Borrelia serology Toxoplasma antibodies Bartonella as antibodies QuantiFERON treponema antibodies were all negative  MRI of the orbits on 8/1/2023 revealed no convincing evidence of optic neuritis     Neuro-ophthalmology recommended workup for possible temporal arteritis and high-dose steroids  Patient started on Solu-Medrol 1 g daily for 3 doses  Given his recent severe neck pain I ordered CTA to rule out possible dissection although this is less likely at this pain started after his vision loss  I have also ordered temporal artery duplex  Follow-up on sed rate and CRP  I ordered MRI of the brain given concern for possible demyelinating lesion noted on MRI of orbits    Neurology Dr. Maldonado consulted will await further evaluation further recommendations    Dyslipidemia  Assessment & Plan  Continue rosuvastatin    Essential hypertension  Assessment & Plan  Continue carvedilol olmesartan spironolactone  Monitor blood pressure adjust accordingly    Type 2 diabetes mellitus, with long-term current use of insulin (HCC)  Assessment & Plan  Will need to closely monitor his CBGs with high-dose steroids  I ordered Lantus and high sliding scale insulin          VTE prophylaxis: SCDs/TEDs

## 2023-08-10 NOTE — CARE PLAN
The patient is Stable - Low risk of patient condition declining or worsening    Shift Goals  Clinical Goals: CT scan and neuro monitoring  Patient Goals: updates  Family Goals: PINO    Progress made toward(s) clinical / shift goals:    Problem: Knowledge Deficit - Standard  Goal: Patient and family/care givers will demonstrate understanding of plan of care, disease process/condition, diagnostic tests and medications  Outcome: Progressing  Note: Patient educated on the purpose of CT scan and iodanated contrast.      Problem: Mobility  Goal: Patient's capacity to carry out activities will improve  Outcome: Progressing  Note: Patient SBA with cane available.      Problem: Fall Risk  Goal: Patient will remain free from falls  Outcome: Progressing  Note: Fall precautions in place. Bed locked and in the lowest position. Call light in reach. Patient educated on the importance of calling for assistance prior to ambulation.        Patient is not progressing towards the following goals: N/A

## 2023-08-10 NOTE — PROGRESS NOTES
Hospital Medicine Daily Progress Note    Date of Service  8/10/2023    Chief Complaint  Ace Curry is a 58 y.o. male admitted 8/9/2023 with vision loss    Hospital Course  57 yo man with DM, HTN who was seen by ophthalmology for progressive right eye vision loss over 2 weeks.  He was seen by Dr. Jacques Torres retina specialists.  He had acute worsening of his right eye vision and ESR was noted to be 44.  He also had a mild leukocytosis with negative syphilis screen.  Dr. Torres spoke with Dr. Gomez who suggested the patient go to the ER for possible giant cell arteritis.  He had MRI that was negative for optic neuritis.  Here, ESR was 21 and CRP 1.61. Neurology was consulted.    Interval Problem Update  Hyperglycemic to 300s, check BMP  HypoNa 129  WBC 14.6  Patient says his vision to his right eye seems to be improving, he is able to see the mirror across his bed now.  He denies any headache and otherwise feels okay.  I spoke with Dr. Torres, given he has improvement of his vision with steroids, this may be arteric related vision loss.  I spoke with neurology, unlikely neurological reason for his vision loss, MRI brain is pending.  Spoke with Dr. Gomez, he recommended lumbar puncture to continue high-dose steroids for 3 doses.  I spoke with the patient and he is agreeable to a temporal artery biopsy.    I have discussed this patient's plan of care and discharge plan at IDT rounds today with Case Management, Nursing, Nursing leadership, and other members of the IDT team.    Consultants/Specialty  neurology    Code Status  Full Code    Disposition  The patient is not medically cleared for discharge to home or a post-acute facility.  Anticipate discharge to: home with close outpatient follow-up    I have placed the appropriate orders for post-discharge needs.    Review of Systems  Review of Systems   HENT:          Denies jaw pain   Eyes:  Positive for blurred vision and pain.   Respiratory:   Negative for shortness of breath.    Cardiovascular:  Negative for chest pain.   Gastrointestinal:  Negative for abdominal pain.   Neurological:  Negative for focal weakness and headaches.   Psychiatric/Behavioral:  Negative for substance abuse.         Physical Exam  Temp:  [36.1 °C (96.9 °F)-36.8 °C (98.3 °F)] 36.5 °C (97.7 °F)  Pulse:  [62-89] 89  Resp:  [17-20] 20  BP: (128-152)/(59-85) 138/76  SpO2:  [94 %-97 %] 94 %    Physical Exam  Vitals and nursing note reviewed.   Constitutional:       General: He is not in acute distress.     Appearance: He is not toxic-appearing.   HENT:      Head: Normocephalic.      Mouth/Throat:      Mouth: Mucous membranes are moist.   Eyes:      General:         Right eye: No discharge.         Left eye: No discharge.      Pupils: Pupils are equal, round, and reactive to light.      Comments: Right eye will contact hand in front of face but incorrect number of fingers identified   Cardiovascular:      Rate and Rhythm: Normal rate and regular rhythm.   Pulmonary:      Effort: Pulmonary effort is normal. No respiratory distress.      Breath sounds: No wheezing or rales.   Abdominal:      Palpations: Abdomen is soft.      Tenderness: There is no abdominal tenderness.   Musculoskeletal:         General: No swelling.      Cervical back: Neck supple.   Skin:     General: Skin is warm and dry.   Neurological:      Mental Status: He is alert and oriented to person, place, and time.      Comments: No facial droop, normal speech.  Equal strength upper and lower extremities         Fluids  No intake or output data in the 24 hours ending 08/10/23 1548    Laboratory  Recent Labs     08/09/23  1513 08/10/23  0045   WBC 13.4* 14.6*   RBC 4.46* 4.49*   HEMOGLOBIN 13.5* 14.0   HEMATOCRIT 41.8* 41.2*   MCV 93.7 91.8   MCH 30.3 31.2   MCHC 32.3 34.0   RDW 47.1 45.9   PLATELETCT 387 387   MPV 10.0 10.1     Recent Labs     08/09/23  1513 08/10/23  0045   SODIUM 133* 129*   POTASSIUM 4.5 5.1   CHLORIDE  98 96   CO2 22 16*   GLUCOSE 111* 288*   BUN 15 20   CREATININE 1.06 1.29   CALCIUM 10.1 9.8                   Imaging  US-TEMPORAL ARTERY DUPLEX BILAT   Final Result      CT-CTA NECK WITH & W/O-POST PROCESSING   Final Result      CT angiogram of the neck within normal limits.      CT-CTA HEAD WITH & W/O-POST PROCESS   Final Result      CT angiogram of the [place] within normal limits.      MR-BRAIN-WITH & W/O    (Results Pending)        Assessment/Plan  * Vision loss of right eye- (present on admission)  Assessment & Plan  Unclear etiology  outpatient workup ACE level Borrelia serology Toxoplasma antibodies Bartonella as antibodies QuantiFERON treponema antibodies were all negative  MRI of the orbits on 8/1/2023 revealed no convincing evidence of optic neuritis   Neurology consulted, CTA head and neck was negative for significant occlusion.  MRI brain is pending  ESR was previously 44 with concerns for temporal arteritis.  I spoke with Dr. Stinson and will plan for biopsy tomorrow.  N.p.o. at midnight  Spoke with Dr. Gomez, he recommended lumbar puncture to continue high-dose steroids for 3 doses.    MOG and aquaporin are pending    Dyslipidemia  Assessment & Plan  Continue rosuvastatin    Essential hypertension  Assessment & Plan  Continue carvedilol olmesartan spironolactone  Intermittently hypertensive    Type 2 diabetes mellitus, with long-term current use of insulin ([facility])  Assessment & Plan  Uncontrolled, glucose 300s  Increase mealtime insulin to 14 units with high ISS  Increase long-acting to 30 units nightly  He seems to be asymptomatic but will check anion gap and beta hydroxybutyrate         VTE prophylaxis:   SCDs/TEDs      I have performed a physical exam and reviewed and updated ROS and Plan today (8/10/2023). In review of yesterday's note (8/9/2023), there are no changes except as documented above.

## 2023-08-10 NOTE — CONSULTS
"Neurology Initial Consult H&P  Neurohospitalist Service, [facility]    Referring Physician: Renny Flower M.D.    Chief Complaint   Patient presents with    Loss of Vision     Complete loss of vision to R eye since 7/27/23.  Pt sent to ED by eye doctor       HPI: Ace Curry is a 58 y.o. man presenting for whom neurology has been consulted for vision loss.     Patient sent to the emergency department by outpatient ophthalmologist.  In the emergency department the patient received first dose of IV steroids.    Patient reports that he first noticed changes in his vision in the right eye on July 27th characterized by waking up and noticed a \"black line in the bottom of my eye\" that progressively worsened over period of days.  Within 3 days he lost about one third of his vision in the right eye with progressive vision loss from the bottom up.  By August 5th approximately 4 days ago the vision loss was completed in the right eye.  No changes or symptoms involving the left eye.  No appreciable change since August 5th.    No associated eye pain.  No headache.  No jaw claudication.  No fever or other constitutional symptoms.  No rash.  No hearing changes.  No weight loss.  No head or eye trauma.    Did have 3 days of neck pain posteriorly that has near completely resolved.  The symptoms occurred after loss of vision.    Review of systems: In addition to what is detailed in the HPI above, all other systems reviewed and are negative.    Past Medical History:    has no past medical history on file.  Type 2 diabetes for about 25 years  CAD/AMI status post CABG  Episodic vertigo lasting about 9 months.  Completely resolved.  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    SHx:       Allergies:  Allergies   Allergen Reactions    Farxiga [Dapagliflozin] Unspecified     Ketoacidosis        Medications:    Current " Facility-Administered Medications:     [START ON 8/10/2023] aspirin EC tablet 81 mg, 81 mg, Oral, DAILY, Renny Flower M.D.    carvedilol (Coreg) tablet 25 mg, 25 mg, Oral, BID, Renny Flower M.D.    olmesartan (Benicar) tablet 20 mg, 20 mg, Oral, Nightly, Renny Flower M.D.    rosuvastatin (Crestor) tablet 5 mg, 5 mg, Oral, Nightly, Renny Flower M.D.    [START ON 8/10/2023] spironolactone (Aldactone) tablet 37.5 mg, 37.5 mg, Oral, Q DAY, Renny Flower M.D.    senna-docusate (Pericolace Or Senokot S) 8.6-50 MG per tablet 2 Tablet, 2 Tablet, Oral, BID **AND** polyethylene glycol/lytes (Miralax) PACKET 1 Packet, 1 Packet, Oral, QDAY PRN **AND** magnesium hydroxide (Milk Of Magnesia) suspension 30 mL, 30 mL, Oral, QDAY PRN **AND** bisacodyl (Dulcolax) suppository 10 mg, 10 mg, Rectal, QDAY PRN, Renny Flower M.D.    acetaminophen (Tylenol) tablet 650 mg, 650 mg, Oral, Q6HRS PRN, Renny Flower M.D.    labetalol (Normodyne/Trandate) injection 10 mg, 10 mg, Intravenous, Q4HRS PRN, Renny Flower M.D.    ondansetron (Zofran) syringe/vial injection 4 mg, 4 mg, Intravenous, Q4HRS PRN, Renny Flower M.D.    ondansetron (Zofran ODT) dispertab 4 mg, 4 mg, Oral, Q4HRS PRN, Renny Flower M.D.    promethazine (Phenergan) tablet 12.5-25 mg, 12.5-25 mg, Oral, Q4HRS PRN, Renny Flower M.D.    promethazine (Phenergan) suppository 12.5-25 mg, 12.5-25 mg, Rectal, Q4HRS PRN, Renny Flower M.D.    prochlorperazine (Compazine) injection 5-10 mg, 5-10 mg, Intravenous, Q4HRS PRN, Renny Flower M.D.    insulin GLARGINE (Lantus,Semglee) injection, 0.2 Units/kg/day, Subcutaneous, Q EVENING **AND** [START ON 8/10/2023] insulin lispro (HumaLOG,AdmeLOG) injection, 0.2 Units/kg/day, Subcutaneous, TID AC **AND** insulin lispro (HumaLOG,AdmeLOG) injection, 3-14 Units, Subcutaneous, 4X/DAY ACHS **AND** POC blood glucose manual result, , , Q AC AND BEDTIME(S)  "**AND** NOTIFY MD and PharmD, , , Once **AND** Administer 20 grams of glucose (approximately 8 ounces of fruit juice) every 15 minutes PRN FSBG less than 70 mg/dL, , , PRN **AND** dextrose 50% (D50W) injection 25 g, 25 g, Intravenous, Q15 MIN PRN, Renny Flower M.D.    [START ON 8/10/2023] methylPREDNISolone (SOLU-MEDROL) 40 MG injection 1,000 mg, 1,000 mg, Intravenous, DAILY, Renny Flower M.D.    Physical Examination:     General: Awake, alert and interactive.  Nontoxic-appearing.  Appears comfortable and in no acute distress. Obese body habitus.  Head/Scalp: No tenderness to palpation over the temporal artery region.  No temporal artery nodules or structural abnormalities appreciated.  Eye: Externally the eyes appear normal.  No conjunctival or scleral injection.    Eyes are conjugate.   Visual acuity is count fingers (OD); 20/40 (OS) with corrective lenses.  Interestingly patient was unable to perceive light (OD).   Neck: There is normal range of motion  Extremities:  clear, dry, intact, without peripheral edema    NEUROLOGICAL EXAM:     BP (!) 142/76   Pulse 66   Temp 36.1 °C (96.9 °F) (Temporal)   Resp 18   Ht 1.88 m (6' 2\")   Wt (!) 132 kg (290 lb 9.1 oz)   SpO2 97%   BMI 37.31 kg/m²     Mental status: Awake, alert and oriented.  Attention is intact.  Answers questions appropriately.  Language: Fluent with intact comprehension.  No dysarthria.  Cranial nerves:    Pupils are equal, round and reactive to light  Versions are full   Intact muscles of mastication    Intact facial sensation   Face appears symmetric   Hearing is intact to conversation   Symmetric shoulder shrug   Symmetric elevation of the palate; uvula appears midline   Tongue protrusion is midline  Motor: Normal bulk and tone.  Antigravity all 4 extremities.  Has a bilateral foot drop.  No pronator drift. No abnormal movements or postures.   Reflexes: Hyporeflexic in the bilateral upper extremities.  Absent DTRs in the " bilateral lower extremities.  Bilateral plantar responses are equivocal.  Sensory: Intact to light-touch. Diminished distal bilateral lower extremities  Coordination: No dysmetria.  Gait: Not tested    Objective Data:    Labs:  No results found for: PROTHROMBTM, INR   Lab Results   Component Value Date/Time    WBC 13.4 (H) 08/09/2023 03:13 PM    RBC 4.46 (L) 08/09/2023 03:13 PM    HEMOGLOBIN 13.5 (L) 08/09/2023 03:13 PM    HEMATOCRIT 41.8 (L) 08/09/2023 03:13 PM    MCV 93.7 08/09/2023 03:13 PM    MCH 30.3 08/09/2023 03:13 PM    MCHC 32.3 08/09/2023 03:13 PM    MPV 10.0 08/09/2023 03:13 PM    NEUTSPOLYS 72.40 (H) 08/09/2023 03:13 PM    LYMPHOCYTES 16.90 (L) 08/09/2023 03:13 PM    MONOCYTES 7.10 08/09/2023 03:13 PM    EOSINOPHILS 2.20 08/09/2023 03:13 PM    BASOPHILS 0.40 08/09/2023 03:13 PM      Lab Results   Component Value Date/Time    SODIUM 133 (L) 08/09/2023 03:13 PM    POTASSIUM 4.5 08/09/2023 03:13 PM    CHLORIDE 98 08/09/2023 03:13 PM    CO2 22 08/09/2023 03:13 PM    GLUCOSE 111 (H) 08/09/2023 03:13 PM    BUN 15 08/09/2023 03:13 PM    CREATININE 1.06 08/09/2023 03:13 PM    BUNCREATRAT NOT APPLICABLE 10/21/2021 08:49 AM    GLOMRATE 80 07/30/2023 10:28 AM      No results found for: CHOLSTRLTOT, LDL, HDL, TRIGLYCERIDE    Lab Results   Component Value Date/Time    ALKPHOSPHAT 58 05/03/2023 06:04 AM    ASTSGOT 22 05/03/2023 06:04 AM    ALTSGPT 25 05/03/2023 06:04 AM    TBILIRUBIN 0.6 05/03/2023 06:04 AM        Imaging/Testing:    I interpreted and/or reviewed the patient's neuroimaging    CT-CTA HEAD WITH & W/O-POST PROCESS    (Results Pending)   CT-CTA NECK WITH & W/O-POST PROCESSING    (Results Pending)   US-TEMPORAL ARTERY DUPLEX BILAT    (Results Pending)       Impression and Recommendations: Painless monocular vision loss (OD). Undetermined etiology.  On my exam the patient was able to count fingers (CF) described as barely perceptible shadows.  Interestingly unable to perceive bright light in the affected  eye.  Neurologic exam with chronic findings consistent with peripheral neuropathy.  Otherwise unrevealing neurologic examination.  Elevated CRP. Repeat ESR is pending.  MRI orbits with and without contrast does not reveal any convincing evidence of optic neuritis.  There was a punctate foci of abnormal white matter signal in the anterior left frontal lobe. I suspect this is an incidental non-specific finding. Otherwise the MRI orbits was unremarkable.  - I agree with obtaining an MRI of the brain with and without contrast to further evaluate abnormality noted on previous MRI of the orbits.  - CTA is pending.   - Otherwise continue work-up and management as recommended by neuro-ophthalmology.    Neurology will follow along to review the MRI brain when complete.         Turner Maldonado MD  Neurohospitalist, Acute Care Services      The evaluation of the patient, and recommended management, was discussed with Dr. Bruno Flower     I personally provided 65 minutes of total acute neurologic care time outside of time spent on separately billable/documented procedures. Time includes: review of laboratory data, review of radiology studies, discussion with consultants, discussion with family/patient, monitoring for potential decompensation.  Interventions were performed as documented in the chart.        Please note that this dictation was created using voice recognition software.  I have made every reasonable attempt to correct obvious errors, but I expect that there are errors of grammar and possibly content that I did not discover before finalizing the note.

## 2023-08-10 NOTE — PROGRESS NOTES
4 Eyes Skin Assessment Completed by PEDRO Swartz and PEDRO Pimentel.    Head WDL  Ears WDL  Nose Scab   Mouth WDL  Neck WDL  Breast/Chest Scar  Shoulder Blades WDL  Spine WDL  (R) Arm/Elbow/Hand Redness and Blanching  (L) Arm/Elbow/Hand Redness and Blanching  Abdomen Scar  Groin WDL  Scrotum/Coccyx/Buttocks WDL  (R) Leg WDL  (L) Leg WDL  (R) Heel/Foot/Toe Redness and Blanching and Callous  (L) Heel/Foot/Toe Redness and Blanching          Devices In Places Blood Pressure Cuff and Pulse Ox      Interventions In Place Pressure Redistribution Mattress    Possible Skin Injury No    Pictures Uploaded Into Epic N/A  Wound Consult Placed N/A  RN Wound Prevention Protocol Ordered No

## 2023-08-10 NOTE — CARE PLAN
The patient is Stable - Low risk of patient condition declining or worsening    Shift Goals  Clinical Goals: CT scan  Patient Goals: Find out what is going on  Family Goals: necessary scan    Progress made toward(s) clinical / shift goals:  Assumed care of patient at 1622, pt AAOx4, pleasant. Updated on POC, pt agreeable. Instructed on use of call bell and to call before rising for safety. Pt scored 7 on Berrios-Yann Fall risk, no alarm needed. Pt verbalized understanding of need to call before rising for visual safety.      Problem: Psychosocial  Goal: Patient's level of anxiety will decrease  Outcome: Progressing     Problem: Communication  Goal: The ability to communicate needs accurately and effectively will improve  Outcome: Progressing     Problem: Urinary Elimination  Goal: Establish and maintain regular urinary output  Outcome: Progressing     Problem: Mobility  Goal: Patient's capacity to carry out activities will improve  Outcome: Progressing     Problem: Self Care  Goal: Patient will have the ability to perform ADLs independently or with assistance (bathe, groom, dress, toilet and feed)  Outcome: Progressing     Problem: Infection - Standard  Goal: Patient will remain free from infection  Outcome: Progressing        Patient is not progressing towards the following goals:

## 2023-08-11 ENCOUNTER — ANESTHESIA EVENT (OUTPATIENT)
Dept: SURGERY | Facility: MEDICAL CENTER | Age: 58
DRG: 115 | End: 2023-08-11
Payer: COMMERCIAL

## 2023-08-11 ENCOUNTER — ANESTHESIA (OUTPATIENT)
Dept: SURGERY | Facility: MEDICAL CENTER | Age: 58
DRG: 115 | End: 2023-08-11
Payer: COMMERCIAL

## 2023-08-11 ENCOUNTER — APPOINTMENT (OUTPATIENT)
Dept: RADIOLOGY | Facility: MEDICAL CENTER | Age: 58
DRG: 115 | End: 2023-08-11
Attending: INTERNAL MEDICINE
Payer: COMMERCIAL

## 2023-08-11 LAB
ANION GAP SERPL CALC-SCNC: 13 MMOL/L (ref 7–16)
BASOPHILS # BLD AUTO: 0.1 % (ref 0–1.8)
BASOPHILS # BLD: 0.02 K/UL (ref 0–0.12)
BUN SERPL-MCNC: 28 MG/DL (ref 8–22)
CALCIUM SERPL-MCNC: 9.5 MG/DL (ref 8.5–10.5)
CHLORIDE SERPL-SCNC: 101 MMOL/L (ref 96–112)
CO2 SERPL-SCNC: 18 MMOL/L (ref 20–33)
CREAT SERPL-MCNC: 1.09 MG/DL (ref 0.5–1.4)
EKG IMPRESSION: NORMAL
EOSINOPHIL # BLD AUTO: 0 K/UL (ref 0–0.51)
EOSINOPHIL NFR BLD: 0 % (ref 0–6.9)
ERYTHROCYTE [DISTWIDTH] IN BLOOD BY AUTOMATED COUNT: 46.2 FL (ref 35.9–50)
ERYTHROCYTE [SEDIMENTATION RATE] IN BLOOD BY WESTERGREN METHOD: 21 MM/HOUR (ref 0–20)
GFR SERPLBLD CREATININE-BSD FMLA CKD-EPI: 78 ML/MIN/1.73 M 2
GLUCOSE BLD STRIP.AUTO-MCNC: 296 MG/DL (ref 65–99)
GLUCOSE BLD STRIP.AUTO-MCNC: 296 MG/DL (ref 65–99)
GLUCOSE BLD STRIP.AUTO-MCNC: 364 MG/DL (ref 65–99)
GLUCOSE BLD STRIP.AUTO-MCNC: 377 MG/DL (ref 65–99)
GLUCOSE BLD STRIP.AUTO-MCNC: 378 MG/DL (ref 65–99)
GLUCOSE BLD STRIP.AUTO-MCNC: 378 MG/DL (ref 65–99)
GLUCOSE SERPL-MCNC: 376 MG/DL (ref 65–99)
HCT VFR BLD AUTO: 40.7 % (ref 42–52)
HGB BLD-MCNC: 13.4 G/DL (ref 14–18)
IMM GRANULOCYTES # BLD AUTO: 0.28 K/UL (ref 0–0.11)
IMM GRANULOCYTES NFR BLD AUTO: 1.5 % (ref 0–0.9)
LYMPHOCYTES # BLD AUTO: 1.21 K/UL (ref 1–4.8)
LYMPHOCYTES NFR BLD: 6.4 % (ref 22–41)
MCH RBC QN AUTO: 30.5 PG (ref 27–33)
MCHC RBC AUTO-ENTMCNC: 32.9 G/DL (ref 32.3–36.5)
MCV RBC AUTO: 92.7 FL (ref 81.4–97.8)
MONOCYTES # BLD AUTO: 0.66 K/UL (ref 0–0.85)
MONOCYTES NFR BLD AUTO: 3.5 % (ref 0–13.4)
NEUTROPHILS # BLD AUTO: 16.67 K/UL (ref 1.82–7.42)
NEUTROPHILS NFR BLD: 88.5 % (ref 44–72)
NRBC # BLD AUTO: 0 K/UL
NRBC BLD-RTO: 0 /100 WBC (ref 0–0.2)
PLATELET # BLD AUTO: 399 K/UL (ref 164–446)
PMV BLD AUTO: 10.4 FL (ref 9–12.9)
POTASSIUM SERPL-SCNC: 4.9 MMOL/L (ref 3.6–5.5)
RBC # BLD AUTO: 4.39 M/UL (ref 4.7–6.1)
SODIUM SERPL-SCNC: 132 MMOL/L (ref 135–145)
WBC # BLD AUTO: 18.8 K/UL (ref 4.8–10.8)

## 2023-08-11 PROCEDURE — 160002 HCHG RECOVERY MINUTES (STAT): Performed by: SURGERY

## 2023-08-11 PROCEDURE — 700105 HCHG RX REV CODE 258: Performed by: INTERNAL MEDICINE

## 2023-08-11 PROCEDURE — 88313 SPECIAL STAINS GROUP 2: CPT

## 2023-08-11 PROCEDURE — 85025 COMPLETE CBC W/AUTO DIFF WBC: CPT

## 2023-08-11 PROCEDURE — 88305 TISSUE EXAM BY PATHOLOGIST: CPT

## 2023-08-11 PROCEDURE — 03BS0ZX EXCISION OF RIGHT TEMPORAL ARTERY, OPEN APPROACH, DIAGNOSTIC: ICD-10-PCS | Performed by: SURGERY

## 2023-08-11 PROCEDURE — 160048 HCHG OR STATISTICAL LEVEL 1-5: Performed by: SURGERY

## 2023-08-11 PROCEDURE — 93005 ELECTROCARDIOGRAM TRACING: CPT | Performed by: ANESTHESIOLOGY

## 2023-08-11 PROCEDURE — 37609 LIGATION/BX TEMPORAL ARTERY: CPT | Mod: RT | Performed by: SURGERY

## 2023-08-11 PROCEDURE — 85652 RBC SED RATE AUTOMATED: CPT

## 2023-08-11 PROCEDURE — 700105 HCHG RX REV CODE 258: Performed by: HOSPITALIST

## 2023-08-11 PROCEDURE — 62270 DX LMBR SPI PNXR: CPT | Performed by: INTERNAL MEDICINE

## 2023-08-11 PROCEDURE — 770006 HCHG ROOM/CARE - MED/SURG/GYN SEMI*

## 2023-08-11 PROCEDURE — 93010 ELECTROCARDIOGRAM REPORT: CPT | Performed by: INTERNAL MEDICINE

## 2023-08-11 PROCEDURE — 99233 SBSQ HOSP IP/OBS HIGH 50: CPT | Performed by: INTERNAL MEDICINE

## 2023-08-11 PROCEDURE — 009U3ZX DRAINAGE OF SPINAL CANAL, PERCUTANEOUS APPROACH, DIAGNOSTIC: ICD-10-PCS | Performed by: RADIOLOGY

## 2023-08-11 PROCEDURE — 700102 HCHG RX REV CODE 250 W/ 637 OVERRIDE(OP): Performed by: HOSPITALIST

## 2023-08-11 PROCEDURE — 99221 1ST HOSP IP/OBS SF/LOW 40: CPT | Performed by: SURGERY

## 2023-08-11 PROCEDURE — 700111 HCHG RX REV CODE 636 W/ 250 OVERRIDE (IP): Mod: JZ | Performed by: HOSPITALIST

## 2023-08-11 PROCEDURE — 700111 HCHG RX REV CODE 636 W/ 250 OVERRIDE (IP): Mod: JZ | Performed by: INTERNAL MEDICINE

## 2023-08-11 PROCEDURE — 82962 GLUCOSE BLOOD TEST: CPT

## 2023-08-11 PROCEDURE — 700105 HCHG RX REV CODE 258: Mod: JZ | Performed by: ANESTHESIOLOGY

## 2023-08-11 PROCEDURE — 700111 HCHG RX REV CODE 636 W/ 250 OVERRIDE (IP): Performed by: ANESTHESIOLOGY

## 2023-08-11 PROCEDURE — 700102 HCHG RX REV CODE 250 W/ 637 OVERRIDE(OP): Performed by: INTERNAL MEDICINE

## 2023-08-11 PROCEDURE — A9270 NON-COVERED ITEM OR SERVICE: HCPCS | Performed by: HOSPITALIST

## 2023-08-11 PROCEDURE — 160036 HCHG PACU - EA ADDL 30 MINS PHASE I: Performed by: SURGERY

## 2023-08-11 PROCEDURE — 80048 BASIC METABOLIC PNL TOTAL CA: CPT

## 2023-08-11 PROCEDURE — 160028 HCHG SURGERY MINUTES - 1ST 30 MINS LEVEL 3: Performed by: SURGERY

## 2023-08-11 PROCEDURE — 160009 HCHG ANES TIME/MIN: Performed by: SURGERY

## 2023-08-11 PROCEDURE — 160035 HCHG PACU - 1ST 60 MINS PHASE I: Performed by: SURGERY

## 2023-08-11 PROCEDURE — 700101 HCHG RX REV CODE 250: Performed by: ANESTHESIOLOGY

## 2023-08-11 PROCEDURE — 00JU3ZZ INSPECTION OF SPINAL CANAL, PERCUTANEOUS APPROACH: ICD-10-PCS | Performed by: INTERNAL MEDICINE

## 2023-08-11 PROCEDURE — 36415 COLL VENOUS BLD VENIPUNCTURE: CPT

## 2023-08-11 RX ORDER — HYDROMORPHONE HYDROCHLORIDE 1 MG/ML
0.4 INJECTION, SOLUTION INTRAMUSCULAR; INTRAVENOUS; SUBCUTANEOUS
Status: DISCONTINUED | OUTPATIENT
Start: 2023-08-11 | End: 2023-08-11 | Stop reason: HOSPADM

## 2023-08-11 RX ORDER — HYDROMORPHONE HYDROCHLORIDE 1 MG/ML
0.1 INJECTION, SOLUTION INTRAMUSCULAR; INTRAVENOUS; SUBCUTANEOUS
Status: DISCONTINUED | OUTPATIENT
Start: 2023-08-11 | End: 2023-08-11 | Stop reason: HOSPADM

## 2023-08-11 RX ORDER — SODIUM CHLORIDE, SODIUM LACTATE, POTASSIUM CHLORIDE, CALCIUM CHLORIDE 600; 310; 30; 20 MG/100ML; MG/100ML; MG/100ML; MG/100ML
INJECTION, SOLUTION INTRAVENOUS
Status: DISCONTINUED | OUTPATIENT
Start: 2023-08-11 | End: 2023-08-11 | Stop reason: SURG

## 2023-08-11 RX ORDER — LIDOCAINE HYDROCHLORIDE 20 MG/ML
INJECTION, SOLUTION EPIDURAL; INFILTRATION; INTRACAUDAL; PERINEURAL PRN
Status: DISCONTINUED | OUTPATIENT
Start: 2023-08-11 | End: 2023-08-11 | Stop reason: SURG

## 2023-08-11 RX ORDER — DEXTROSE MONOHYDRATE 25 G/50ML
25 INJECTION, SOLUTION INTRAVENOUS
Status: DISCONTINUED | OUTPATIENT
Start: 2023-08-11 | End: 2023-08-11

## 2023-08-11 RX ORDER — MIDAZOLAM HYDROCHLORIDE 1 MG/ML
INJECTION INTRAMUSCULAR; INTRAVENOUS PRN
Status: DISCONTINUED | OUTPATIENT
Start: 2023-08-11 | End: 2023-08-11 | Stop reason: SURG

## 2023-08-11 RX ORDER — DEXTROSE MONOHYDRATE 25 G/50ML
25 INJECTION, SOLUTION INTRAVENOUS
Status: DISCONTINUED | OUTPATIENT
Start: 2023-08-11 | End: 2023-08-12

## 2023-08-11 RX ORDER — OXYCODONE HCL 5 MG/5 ML
5 SOLUTION, ORAL ORAL
Status: DISCONTINUED | OUTPATIENT
Start: 2023-08-11 | End: 2023-08-11 | Stop reason: HOSPADM

## 2023-08-11 RX ORDER — HYDRALAZINE HYDROCHLORIDE 20 MG/ML
5 INJECTION INTRAMUSCULAR; INTRAVENOUS
Status: DISCONTINUED | OUTPATIENT
Start: 2023-08-11 | End: 2023-08-11 | Stop reason: HOSPADM

## 2023-08-11 RX ORDER — HYDROMORPHONE HYDROCHLORIDE 1 MG/ML
0.2 INJECTION, SOLUTION INTRAMUSCULAR; INTRAVENOUS; SUBCUTANEOUS
Status: DISCONTINUED | OUTPATIENT
Start: 2023-08-11 | End: 2023-08-11 | Stop reason: HOSPADM

## 2023-08-11 RX ORDER — CEFAZOLIN SODIUM 1 G/3ML
INJECTION, POWDER, FOR SOLUTION INTRAMUSCULAR; INTRAVENOUS PRN
Status: DISCONTINUED | OUTPATIENT
Start: 2023-08-11 | End: 2023-08-11 | Stop reason: SURG

## 2023-08-11 RX ORDER — HALOPERIDOL 5 MG/ML
1 INJECTION INTRAMUSCULAR
Status: DISCONTINUED | OUTPATIENT
Start: 2023-08-11 | End: 2023-08-11 | Stop reason: HOSPADM

## 2023-08-11 RX ORDER — INSULIN LISPRO 100 [IU]/ML
3-14 INJECTION, SOLUTION INTRAVENOUS; SUBCUTANEOUS
Status: DISCONTINUED | OUTPATIENT
Start: 2023-08-11 | End: 2023-08-11

## 2023-08-11 RX ORDER — SODIUM CHLORIDE, SODIUM LACTATE, POTASSIUM CHLORIDE, CALCIUM CHLORIDE 600; 310; 30; 20 MG/100ML; MG/100ML; MG/100ML; MG/100ML
INJECTION, SOLUTION INTRAVENOUS CONTINUOUS
Status: DISCONTINUED | OUTPATIENT
Start: 2023-08-11 | End: 2023-08-11 | Stop reason: HOSPADM

## 2023-08-11 RX ORDER — ONDANSETRON 2 MG/ML
4 INJECTION INTRAMUSCULAR; INTRAVENOUS
Status: DISCONTINUED | OUTPATIENT
Start: 2023-08-11 | End: 2023-08-11 | Stop reason: HOSPADM

## 2023-08-11 RX ORDER — EPHEDRINE SULFATE 50 MG/ML
5 INJECTION, SOLUTION INTRAVENOUS
Status: DISCONTINUED | OUTPATIENT
Start: 2023-08-11 | End: 2023-08-11 | Stop reason: HOSPADM

## 2023-08-11 RX ORDER — INSULIN LISPRO 100 [IU]/ML
25 INJECTION, SOLUTION INTRAVENOUS; SUBCUTANEOUS
Status: DISCONTINUED | OUTPATIENT
Start: 2023-08-11 | End: 2023-08-11

## 2023-08-11 RX ORDER — ONDANSETRON 2 MG/ML
INJECTION INTRAMUSCULAR; INTRAVENOUS PRN
Status: DISCONTINUED | OUTPATIENT
Start: 2023-08-11 | End: 2023-08-11 | Stop reason: SURG

## 2023-08-11 RX ORDER — OXYCODONE HCL 5 MG/5 ML
10 SOLUTION, ORAL ORAL
Status: DISCONTINUED | OUTPATIENT
Start: 2023-08-11 | End: 2023-08-11 | Stop reason: HOSPADM

## 2023-08-11 RX ORDER — LABETALOL HYDROCHLORIDE 5 MG/ML
5 INJECTION, SOLUTION INTRAVENOUS
Status: DISCONTINUED | OUTPATIENT
Start: 2023-08-11 | End: 2023-08-11 | Stop reason: HOSPADM

## 2023-08-11 RX ORDER — INSULIN LISPRO 100 [IU]/ML
30 INJECTION, SOLUTION INTRAVENOUS; SUBCUTANEOUS
Status: DISCONTINUED | OUTPATIENT
Start: 2023-08-11 | End: 2023-08-12

## 2023-08-11 RX ORDER — INSULIN LISPRO 100 [IU]/ML
3-14 INJECTION, SOLUTION INTRAVENOUS; SUBCUTANEOUS
Status: DISCONTINUED | OUTPATIENT
Start: 2023-08-11 | End: 2023-08-12

## 2023-08-11 RX ORDER — DIPHENHYDRAMINE HYDROCHLORIDE 50 MG/ML
12.5 INJECTION INTRAMUSCULAR; INTRAVENOUS
Status: DISCONTINUED | OUTPATIENT
Start: 2023-08-11 | End: 2023-08-11 | Stop reason: HOSPADM

## 2023-08-11 RX ADMIN — SODIUM CHLORIDE 1000 MG: 9 INJECTION, SOLUTION INTRAVENOUS at 05:43

## 2023-08-11 RX ADMIN — CEFAZOLIN 3 G: 1 INJECTION, POWDER, FOR SOLUTION INTRAMUSCULAR; INTRAVENOUS at 12:40

## 2023-08-11 RX ADMIN — OLMESARTAN MEDOXOMIL 20 MG: 20 TABLET, FILM COATED ORAL at 22:04

## 2023-08-11 RX ADMIN — INSULIN LISPRO 25 UNITS: 100 INJECTION, SOLUTION INTRAVENOUS; SUBCUTANEOUS at 07:57

## 2023-08-11 RX ADMIN — ONDANSETRON 4 MG: 2 INJECTION INTRAMUSCULAR; INTRAVENOUS at 12:50

## 2023-08-11 RX ADMIN — ROSUVASTATIN CALCIUM 5 MG: 5 TABLET, FILM COATED ORAL at 22:04

## 2023-08-11 RX ADMIN — INSULIN LISPRO 14 UNITS: 100 INJECTION, SOLUTION INTRAVENOUS; SUBCUTANEOUS at 12:28

## 2023-08-11 RX ADMIN — PROPOFOL 120 MG: 10 INJECTION, EMULSION INTRAVENOUS at 12:44

## 2023-08-11 RX ADMIN — INSULIN LISPRO 30 UNITS: 100 INJECTION, SOLUTION INTRAVENOUS; SUBCUTANEOUS at 17:27

## 2023-08-11 RX ADMIN — INSULIN LISPRO 7 UNITS: 100 INJECTION, SOLUTION INTRAVENOUS; SUBCUTANEOUS at 17:28

## 2023-08-11 RX ADMIN — CARVEDILOL 25 MG: 6.25 TABLET, FILM COATED ORAL at 17:24

## 2023-08-11 RX ADMIN — LIDOCAINE HYDROCHLORIDE 100 MG: 20 INJECTION, SOLUTION EPIDURAL; INFILTRATION; INTRACAUDAL at 12:43

## 2023-08-11 RX ADMIN — ONDANSETRON 4 MG: 2 INJECTION INTRAMUSCULAR; INTRAVENOUS at 08:16

## 2023-08-11 RX ADMIN — SENNOSIDES AND DOCUSATE SODIUM 2 TABLET: 50; 8.6 TABLET ORAL at 17:24

## 2023-08-11 RX ADMIN — SODIUM CHLORIDE, POTASSIUM CHLORIDE, SODIUM LACTATE AND CALCIUM CHLORIDE: 600; 310; 30; 20 INJECTION, SOLUTION INTRAVENOUS at 12:37

## 2023-08-11 RX ADMIN — MIDAZOLAM 2 MG: 1 INJECTION, SOLUTION INTRAMUSCULAR; INTRAVENOUS at 12:39

## 2023-08-11 RX ADMIN — INSULIN LISPRO 12 UNITS: 100 INJECTION, SOLUTION INTRAVENOUS; SUBCUTANEOUS at 22:05

## 2023-08-11 RX ADMIN — SODIUM CHLORIDE 1000 MG: 9 INJECTION, SOLUTION INTRAVENOUS at 17:37

## 2023-08-11 RX ADMIN — FENTANYL CITRATE 100 MCG: 50 INJECTION, SOLUTION INTRAMUSCULAR; INTRAVENOUS at 12:41

## 2023-08-11 ASSESSMENT — PATIENT HEALTH QUESTIONNAIRE - PHQ9
2. FEELING DOWN, DEPRESSED, IRRITABLE, OR HOPELESS: NOT AT ALL
1. LITTLE INTEREST OR PLEASURE IN DOING THINGS: NOT AT ALL
SUM OF ALL RESPONSES TO PHQ9 QUESTIONS 1 AND 2: 0

## 2023-08-11 ASSESSMENT — PAIN DESCRIPTION - PAIN TYPE
TYPE: SURGICAL PAIN
TYPE: ACUTE PAIN
TYPE: SURGICAL PAIN

## 2023-08-11 ASSESSMENT — FIBROSIS 4 INDEX: FIB4 SCORE: 0.65

## 2023-08-11 ASSESSMENT — ENCOUNTER SYMPTOMS
HEADACHES: 0
FOCAL WEAKNESS: 0
BLURRED VISION: 1
SHORTNESS OF BREATH: 0
ABDOMINAL PAIN: 0
EYE PAIN: 0

## 2023-08-11 ASSESSMENT — LIFESTYLE VARIABLES: SUBSTANCE_ABUSE: 0

## 2023-08-11 NOTE — ANESTHESIA PROCEDURE NOTES
Airway    Date/Time: 8/11/2023 12:44 PM    Performed by: Ken Young M.D.  Authorized by: Ken Young M.D.    Location:  OR  Urgency:  Elective  Difficult Airway: No    Indications for Airway Management:  Anesthesia      Spontaneous Ventilation: absent    Sedation Level:  Deep  Preoxygenated: Yes    Mask Difficulty Assessment:  0 - not attempted  Final Airway Type:  Supraglottic airway  Final Supraglottic Airway:  Standard LMA    SGA Size:  5  Number of Attempts at Approach:  1

## 2023-08-11 NOTE — PROGRESS NOTES
Patient arrived back to 90-1 with transport via hospital bed.   Pt AAOx4, no complaints of pain.   Surgical site to Right temple CDI - closed with dermabond.   Diet ordered and will advance as tolerated.

## 2023-08-11 NOTE — OP REPORT
VASCULAR SURGERY OPERATIVE REPORT       DATE OF SERVICE:  08/11/2023     SURGEON:  Catherine Grimes MD     ANESTHESIOLOGIST:  Ken Young MD     TYPE OF ANESTHESIA:  General anesthesia.     PREOPERATIVE DIAGNOSIS:  Right temporal headache, concerning for temporal   arteritis.     POSTOPERATIVE DIAGNOSIS:  Right temporal headache, concerning for temporal   arteritis.     PROCEDURE:  Excisional biopsy of right temporal artery.     INDICATIONS FOR PROCEDURE:  This is a pleasant 58-year-old male with multiple   medical problems, who was admitted for right temporal headache and visual   changes.  Vascular service was consulted for temporal artery biopsy.    Discussion was made with the patient.  He would like to proceed, fully   understanding all risks.     DESCRIPTION OF PROCEDURE:  Informed consent was obtained.  The patient was   taken to the operating room and was placed in supine position.  Sequential   compression devices were applied.  The patient was given Ancef intravenously.    General anesthesia was induced.     Next, his right temporal area was sterilely prepped and draped in the normal   fashion.  A timeout procedure was done.  An incision was made.  The incision   was extended through the subcutaneous tissue using the electrocautery.  The   temporal arterial branch was identified, clipped with Hemoclips and the   segment between the clips was harvested and sent to pathology.  The wound was   then anesthetized with 9 mL of 0.5% Marcaine solution.  The wound was   irrigated, was found to have excellent hemostasis.  The wound was closed   subcutaneously with running 3-0 Vicryl suture and subcuticularly with 4-0   Monocryl suture.  The wound was cleaned and Dermabond was applied.     ESTIMATED BLOOD LOSS:  None.     COUNTS:  Sponge and instrument count was correct x2.     The patient was then awakened, extubated, taken to the recovery area in stable    condition.        ______________________________  MD NEGIN Mortensen/SOPHIE/RUDY    DD:  08/11/2023 13:12  DT:  08/11/2023 13:32    Job#:  [id number]

## 2023-08-11 NOTE — CONSULTS
VASCULAR SURGERY SERVICE  CONSULT NOTE      Date: 8/11/2023    Referring Provider: Young Najera M.d.    Consulting Physician: Ken Yanez MD    -------------------------------------------------------------------------------------------------    Reason for consultation:  Temporal artery biopsy    HPI:  This is a 58 y.o. male who has been experiencing symptoms of Right eye visual disturbance.  Symptoms are concerning for possible temporal arteritis and temporal artery biopsy was requested.  When I came to see the patient he was alert and conversant in no distress.  The symptoms are present currently.    History reviewed. No pertinent past medical history.    No past surgical history on file.    Current Facility-Administered Medications   Medication Dose Route Frequency Provider Last Rate Last Admin    insulin GLARGINE (Lantus,Semglee) injection  20 Units Subcutaneous BID Young Najera M.D.        And    insulin lispro (HumaLOG,AdmeLOG) injection  25 Units Subcutaneous TID AC Young Najera M.D.        And    insulin lispro (HumaLOG,AdmeLOG) injection  3-14 Units Subcutaneous 4X/DAY PARDEEP Najera M.D.        And    dextrose 50% (D50W) injection 25 g  25 g Intravenous Q15 MIN PRN Young Najera M.D.        [Held by provider] aspirin EC tablet 81 mg  81 mg Oral DAILY Renny Flower M.D.   81 mg at 08/10/23 0534    carvedilol (Coreg) tablet 25 mg  25 mg Oral BID Renny Flower M.D.   25 mg at 08/10/23 1620    olmesartan (Benicar) tablet 20 mg  20 mg Oral Nightly Renny Folwer M.D.   20 mg at 08/10/23 2138    rosuvastatin (Crestor) tablet 5 mg  5 mg Oral Nightly Renny Flower M.D.   5 mg at 08/10/23 2138    spironolactone (Aldactone) tablet 37.5 mg  37.5 mg Oral Q DAY Renny Flower M.D.   37.5 mg at 08/10/23 0534    senna-docusate (Pericolace Or Senokot S) 8.6-50 MG per tablet 2 Tablet  2 Tablet Oral BID Renny Flower M.D.        And    polyethylene glycol/lytes (Miralax) PACKET 1 Packet   1 Packet Oral QDAY PRN Renny Flower M.D.        And    magnesium hydroxide (Milk Of Magnesia) suspension 30 mL  30 mL Oral QDAY PRN Renny Flower M.D.        And    bisacodyl (Dulcolax) suppository 10 mg  10 mg Rectal QDAY PRN Renny Flower M.D.        acetaminophen (Tylenol) tablet 650 mg  650 mg Oral Q6HRS PRJOSH Flower M.D.        labetalol (Normodyne/Trandate) injection 10 mg  10 mg Intravenous Q4HRS PRN Renny Flower M.D.        ondansetron (Zofran) syringe/vial injection 4 mg  4 mg Intravenous Q4HRS PRJOSH Flower M.D.        ondansetron (Zofran ODT) dispertab 4 mg  4 mg Oral Q4HRS PRJOSH Flower M.D.        promethazine (Phenergan) tablet 12.5-25 mg  12.5-25 mg Oral Q4HRS PRJOSH Flower M.D.        promethazine (Phenergan) suppository 12.5-25 mg  12.5-25 mg Rectal Q4HRS PRJOSH Flower M.D.        prochlorperazine (Compazine) injection 5-10 mg  5-10 mg Intravenous Q4HRS TRA Flower M.D.           Social History     Socioeconomic History    Marital status:      Spouse name: Not on file    Number of children: Not on file    Years of education: Not on file    Highest education level: Not on file   Occupational History    Not on file   Tobacco Use    Smoking status: Not on file    Smokeless tobacco: Not on file   Substance and Sexual Activity    Alcohol use: Not on file    Drug use: Not on file    Sexual activity: Not on file   Other Topics Concern    Not on file   Social History Narrative    Not on file     Social Determinants of Health     Financial Resource Strain: Not on file   Food Insecurity: Not on file   Transportation Needs: Not on file   Physical Activity: Not on file   Stress: Not on file   Social Connections: Not on file   Intimate Partner Violence: Not on file   Housing Stability: Not on file       No family history on file.    Allergies:  Farxiga [dapagliflozin]    Review of Systems:   "  Constitutional: Negative for fever, chills, weight loss,   HENT:   Negative for hearing loss or tinnitus    Eyes:   As per HPI  Respiratory:  Negative for cough, hemoptysis, or wheezing    Cardiac:  Negative for chest pain or palpitations or orthopnea  Vascular:  Negative for claudication or rest pain   Gastrointestinal: Negative for nausea, vomiting, or abdominal pain     Negative for hematochezia or melena   Genitourinary: Negative for dysuria, frequency, or hematuria   Musculoskeletal: Negative for myalgias, back pain, or joint pain  Skin:   Negative for itching or rash  Neurological:  Negative for speech disturbance     Negative for extremity weakness or paresthesias  Endo/Heme:  Negative for easy bruising or bleeding  Psychiatric:  Negative for depression, suicidal ideas, or hallucinations    Physical Exam:  /62   Pulse 77   Temp 36.2 °C (97.1 °F) (Temporal)   Resp 16   Ht 1.88 m (6' 2\")   Wt (!) 132 kg (290 lb 9.1 oz)   SpO2 93%     Constitutional: Alert, oriented, no acute distress  HEENT:  Normocephalic and atraumatic, EOMI  Neck:   Supple, no JVD,   Cardiovascular: Regular rate and rhythm,   Pulmonary:  Good air entry bilaterally,    Abdominal:  Soft, non-tender, non-distended     Aortic impulse not widened  Musculoskeletal: No edema, no tenderness  Neurological:  CN II-XII grossly intact, no focal deficits  Skin:   Skin is warm and dry. No rash noted.  Psychiatric:  Normal mood and affect.  Vascular:  Extremities warm and well perfused    Labs:  Recent Labs     08/09/23  1513 08/10/23  0045   WBC 13.4* 14.6*   RBC 4.46* 4.49*   HEMOGLOBIN 13.5* 14.0   HEMATOCRIT 41.8* 41.2*   MCV 93.7 91.8   MCH 30.3 31.2   MCHC 32.3 34.0   RDW 47.1 45.9   PLATELETCT 387 387   MPV 10.0 10.1     Recent Labs     08/09/23  1513 08/10/23  0045 08/10/23  1650   SODIUM 133* 129* 130*   POTASSIUM 4.5 5.1 4.6   CHLORIDE 98 96 96   CO2 22 16* 17*   GLUCOSE 111* 288* 378*   BUN 15 20 27*   CREATININE 1.06 1.29 1.24 "   CALCIUM 10.1 9.8 9.9     Recent Labs     08/10/23  1650   APTT 28.6   INR 1.08     Recent Labs     08/10/23  1650   INR 1.08           Assessment/Plan:  -  Headache, possible temporal arteritis    Patients symptoms are concerning for possible temporal arteritis.  Recommend temporal artery biopsy.  I explained the details of the operation, alternatives, and potential risks, including but not limited to bleeding, infection, and risks of anesthesia.  All questions were answered. Patient understands and agrees to proceed.      Ken Yanez MD  [facility]   [place] preferred or call my office [phone]  __________________________________________________________________  Patient:Ace Curry   MRN:[id number]   CSN:[id number]

## 2023-08-11 NOTE — PROCEDURES
Procedure: Lumbar Puncture    Patient was explained procedure and need of the procedure. She agree and consented.  Time out was taken. patient was left lateral postion. The L4-L5 area was identified and cleaned with three betadine rinses. I was with sterile gloves. The area was than anesthetized with 1% lidocaine injection. A LP needle was introduced but spinal fluid was not able to be accessed.  The needle was removed, and no bleeding was observed a band aid was placed over the injection site.

## 2023-08-11 NOTE — ANESTHESIA TIME REPORT
Anesthesia Start and Stop Event Times     Date Time Event    8/11/2023 1200 Ready for Procedure     1237 Anesthesia Start     1311 Anesthesia Stop        Responsible Staff  08/11/23    Name Role Begin End    Ken Young M.D. Anesth 1237 1311        Overtime Reason:  no overtime (within assigned shift)    Comments:

## 2023-08-11 NOTE — ANESTHESIA PREPROCEDURE EVALUATION
Case: [id number] Date/Time: 08/11/23 1305    Procedure: BIOPSY, ARTERY, TEMPORAL (Right)    Location: [facility] OR 10 / SURGERY [facility]    Surgeons: Catherine Grimes M.D.          Relevant Problems   CARDIAC   (positive) Essential hypertension      ENDO   (positive) Type 2 diabetes mellitus, with long-term current use of insulin (HCC)      Other   (positive) Dyslipidemia   (positive) Vision loss of right eye   BMI 37    Physical Exam    Airway   Mallampati: II  TM distance: >3 FB  Neck ROM: full       Cardiovascular - normal exam  Rhythm: regular  Rate: normal  (-) murmur     Dental - normal exam           Pulmonary - normal exam  Breath sounds clear to auscultation     Abdominal   (+) obese     Neurological - normal exam                 Anesthesia Plan    ASA 3   ASA physical status 3 criteria: diabetes - poorly controlled    Plan - general       Airway plan will be LMA          Induction: intravenous    Postoperative Plan: Postoperative administration of opioids is intended.    Pertinent diagnostic labs and testing reviewed    Informed Consent:    Anesthetic plan and risks discussed with patient.    Use of blood products discussed with: patient whom consented to blood products.

## 2023-08-11 NOTE — PROGRESS NOTES
Monitor Summary: SR-ST , CT .0.14, QRS .0.10, QT .0.32 with rare PVCs per strip from monitor room

## 2023-08-11 NOTE — OR SURGEON
VASCULAR SURGERY IMMEDIATE POST OP NOTE       PreOp Diagnosis: Right temporal headache, concerning for temporal arteritis.      PostOp Diagnosis: Same.    Procedure(s):  Excisional biopsy of right temporal artery - Wound Class: Clean    Surgeon(s):  Catherine Grimes M.D.    Anesthesiologist/Type of Anesthesia:  Anesthesiologist: Ken Young M.D./General    Surgical Staff:  Circulator: Navdeep Ventura R.N.  Scrub Person: Sandy Knowles    Specimens removed if any:  ID Type Source Tests Collected by Time Destination   A : right temporal artery Other Other PATHOLOGY SPECIMEN Catherine Grimes M.D. 8/11/2023 12:55 PM        Estimated Blood Loss: None.    Findings: Grossly normal-appearing temporal arterial branch.     Complications: None.    Dictated, #[id number].        8/11/2023 1:08 PM Catherine Grimes M.D.

## 2023-08-11 NOTE — CARE PLAN
The patient is Stable - Low risk of patient condition declining or worsening    Shift Goals  Clinical Goals: manage blood glucose levels  Patient Goals: drink water  Family Goals: PINO    Progress made toward(s) clinical / shift goals:  Patient extremely anxious due to being NPO at midnight. Patient stated he would have emesis and have to drink water out of the faucet if necessary. This RN informed patient she will not refuse him water but explained to patient if he drinks water the provider might opt to cancel the procedure due to safety concerns. Patient verbalized understanding.   Problem: Psychosocial  Goal: Patient's level of anxiety will decrease  Outcome: Progressing     Problem: Urinary Elimination  Goal: Establish and maintain regular urinary output  Outcome: Progressing     Problem: Mobility  Goal: Patient's capacity to carry out activities will improve  Outcome: Progressing     Problem: Fall Risk  Goal: Patient will remain free from falls  Outcome: Progressing       Patient is not progressing towards the following goals:    Problem: Psychosocial  Goal: Patient's ability to re-evaluate and adapt role responsibilities will improve  Outcome: Not Progressing  Goal: Patient and family will demonstrate ability to cope with life altering diagnosis and/or procedure  Outcome: Not Progressing

## 2023-08-11 NOTE — OR NURSING
Patient arrived to PACU in stable condition.  Oral airway in place, removed at 1312  Surgical site to R temple. Closed with dermabond  Patient comfortable and denies pain  Patient tolerated clears without nausea or vomiting  FSBS 296  Report given to Sheridan VASQUEZ. Patient transferred back to S190/1

## 2023-08-11 NOTE — PROGRESS NOTES
Hospital Medicine Daily Progress Note    Date of Service  8/11/2023    Chief Complaint  Ace Curry is a 58 y.o. male admitted 8/9/2023 with vision loss    Hospital Course  57 yo man with DM, HTN who was seen by ophthalmology for progressive right eye vision loss over 2 weeks.  He was seen by Dr. Jacques Torres retina specialists.  He had acute worsening of his right eye vision and ESR was noted to be 44.  He also had a mild leukocytosis with negative syphilis screen.  Dr. Torres spoke with Dr. Gomez who suggested the patient go to the ER for possible giant cell arteritis.  He had MRI that was negative for optic neuritis.  Here, ESR was 21 and CRP 1.61. Neurology was consulted.    Interval Problem Update  Remains hyperglycemic  Continues to see a little better from his right eye.  Able to see the clock face across his room, some of the numbers  Denies any head, neck, jaw pain  Renal artery biopsy today    I have discussed this patient's plan of care and discharge plan at IDT rounds today with Case Management, Nursing, Nursing leadership, and other members of the IDT team.    Consultants/Specialty  neurology    Code Status  Full Code    Disposition  The patient is not medically cleared for discharge to home or a post-acute facility.  Anticipate discharge to: home with close outpatient follow-up    I have placed the appropriate orders for post-discharge needs.    Review of Systems  Review of Systems   HENT:          Denies jaw pain   Eyes:  Positive for blurred vision. Negative for pain.   Respiratory:  Negative for shortness of breath.    Cardiovascular:  Negative for chest pain.   Gastrointestinal:  Negative for abdominal pain.   Neurological:  Negative for focal weakness and headaches.   Psychiatric/Behavioral:  Negative for substance abuse.         Physical Exam  Temp:  [36.1 °C (97 °F)-37.1 °C (98.8 °F)] 37.1 °C (98.8 °F)  Pulse:  [69-91] 71  Resp:  [16-18] 16  BP: (112-162)/(59-77) 159/74  SpO2:  [92  %-96 %] 93 %    Physical Exam  Vitals and nursing note reviewed.   Constitutional:       General: He is not in acute distress.     Appearance: He is not toxic-appearing.   HENT:      Head: Normocephalic.      Mouth/Throat:      Mouth: Mucous membranes are moist.   Eyes:      General:         Right eye: No discharge.         Left eye: No discharge.      Pupils: Pupils are equal, round, and reactive to light.      Comments: Right eye will contact hand in front of face but incorrect number of fingers identified   Cardiovascular:      Rate and Rhythm: Normal rate and regular rhythm.   Pulmonary:      Effort: Pulmonary effort is normal. No respiratory distress.      Breath sounds: No wheezing or rales.   Abdominal:      Palpations: Abdomen is soft.      Tenderness: There is no abdominal tenderness.   Musculoskeletal:         General: No swelling.      Cervical back: Neck supple.   Skin:     General: Skin is warm and dry.   Neurological:      Mental Status: He is alert and oriented to person, place, and time.      Comments: No facial droop, normal speech.  Equal strength upper and lower extremities         Fluids    Intake/Output Summary (Last 24 hours) at 8/11/2023 1552  Last data filed at 8/11/2023 1310  Gross per 24 hour   Intake 400 ml   Output 2 ml   Net 398 ml       Laboratory  Recent Labs     08/09/23  1513 08/10/23  0045 08/11/23  0711   WBC 13.4* 14.6* 18.8*   RBC 4.46* 4.49* 4.39*   HEMOGLOBIN 13.5* 14.0 13.4*   HEMATOCRIT 41.8* 41.2* 40.7*   MCV 93.7 91.8 92.7   MCH 30.3 31.2 30.5   MCHC 32.3 34.0 32.9   RDW 47.1 45.9 46.2   PLATELETCT 387 387 399   MPV 10.0 10.1 10.4     Recent Labs     08/10/23  0045 08/10/23  1650 08/11/23  0711   SODIUM 129* 130* 132*   POTASSIUM 5.1 4.6 4.9   CHLORIDE 96 96 101   CO2 16* 17* 18*   GLUCOSE 288* 378* 376*   BUN 20 27* 28*   CREATININE 1.29 1.24 1.09   CALCIUM 9.8 9.9 9.5     Recent Labs     08/10/23  1650   APTT 28.6   INR 1.08               Imaging  US-TEMPORAL ARTERY  DUPLEX BILAT   Final Result      CT-CTA NECK WITH & W/O-POST PROCESSING   Final Result      CT angiogram of the neck within normal limits.      CT-CTA HEAD WITH & W/O-POST PROCESS   Final Result      CT angiogram of the [place] within normal limits.      MR-BRAIN-WITH & W/O    (Results Pending)   DX-LUMBAR PUNCTURE FOR DIAGNOSIS    (Results Pending)        Assessment/Plan  * Vision loss of right eye- (present on admission)  Assessment & Plan  Unclear etiology  outpatient workup ACE level Borrelia serology Toxoplasma antibodies Bartonella as antibodies QuantiFERON treponema antibodies were all negative  MRI of the orbits on 8/1/2023 revealed no convincing evidence of optic neuritis   Neurology consulted, CTA head and neck was negative for significant occlusion.  MRI brain is pending  ESR was previously 44 with concerns for temporal arteritis.  Had temporal artery biopsy today, f/u path  I spoke with Dr. Gomez, additional IV Solu-Medrol today then plan for prednisone1 mg/kg/day and he will follow-up with an appointment on Monday at 7:30 AM in clinic  Bedside lumbar puncture unsuccessful, radiology will be able to do it tomorrow  MOG and aquaporin are pending    Dyslipidemia  Assessment & Plan  Continue rosuvastatin    Essential hypertension  Assessment & Plan  Continue carvedilol olmesartan spironolactone  Intermittently hypertensive    Type 2 diabetes mellitus, with long-term current use of insulin (HCC)  Assessment & Plan  Uncontrolled, glucose 300s  Increase mealtime insulin to 25 units with high ISS  Increase long-acting to 20U BID           VTE prophylaxis:   SCDs/TEDs      I have performed a physical exam and reviewed and updated ROS and Plan today (8/11/2023). In review of yesterday's note (8/10/2023), there are no changes except as documented above.

## 2023-08-11 NOTE — ANESTHESIA POSTPROCEDURE EVALUATION
Patient: Ace Curry    Procedure Summary     Date: 08/11/23 Room / Location: [place] [address] / SURGERY [facility]    Anesthesia Start: 1237 Anesthesia Stop: 1311    Procedure: BIOPSY, ARTERY, TEMPORAL (Right: Head) Diagnosis: (right temoral HA)    Surgeons: Catherine Grimes M.D. Responsible Provider: Ken Young M.D.    Anesthesia Type: general ASA Status: 3          Final Anesthesia Type: general  Last vitals  BP   Blood Pressure: (!) 143/71    Temp   37.1 °C (98.8 °F)    Pulse   78   Resp   16    SpO2   93 %      Anesthesia Post Evaluation    Patient location during evaluation: PACU  Patient participation: complete - patient participated  Level of consciousness: awake and alert    Airway patency: patent  Anesthetic complications: no  Cardiovascular status: hemodynamically stable  Respiratory status: acceptable  Hydration status: euvolemic    PONV: none          No notable events documented.     Nurse Pain Score: 0 (NPRS)

## 2023-08-11 NOTE — PROGRESS NOTES
Vascular surgery.    Right temporal artery excisional biopsy was performed without difficulty.  Keep incision dry for 2 days.  No sutures to be removed.    Vascular service will sign off.  Please call for questions or concerns.

## 2023-08-12 ENCOUNTER — APPOINTMENT (OUTPATIENT)
Dept: RADIOLOGY | Facility: MEDICAL CENTER | Age: 58
DRG: 115 | End: 2023-08-12
Attending: INTERNAL MEDICINE
Payer: COMMERCIAL

## 2023-08-12 LAB
ANION GAP SERPL CALC-SCNC: 14 MMOL/L (ref 7–16)
BASOPHILS # BLD AUTO: 0.1 % (ref 0–1.8)
BASOPHILS # BLD: 0.01 K/UL (ref 0–0.12)
BUN SERPL-MCNC: 29 MG/DL (ref 8–22)
CALCIUM SERPL-MCNC: 9.5 MG/DL (ref 8.5–10.5)
CHLORIDE SERPL-SCNC: 102 MMOL/L (ref 96–112)
CO2 SERPL-SCNC: 19 MMOL/L (ref 20–33)
CREAT SERPL-MCNC: 1.23 MG/DL (ref 0.5–1.4)
EOSINOPHIL # BLD AUTO: 0 K/UL (ref 0–0.51)
EOSINOPHIL NFR BLD: 0 % (ref 0–6.9)
ERYTHROCYTE [DISTWIDTH] IN BLOOD BY AUTOMATED COUNT: 47 FL (ref 35.9–50)
GFR SERPLBLD CREATININE-BSD FMLA CKD-EPI: 67 ML/MIN/1.73 M 2
GLUCOSE BLD STRIP.AUTO-MCNC: 327 MG/DL (ref 65–99)
GLUCOSE BLD STRIP.AUTO-MCNC: 341 MG/DL (ref 65–99)
GLUCOSE BLD STRIP.AUTO-MCNC: 343 MG/DL (ref 65–99)
GLUCOSE BLD STRIP.AUTO-MCNC: 350 MG/DL (ref 65–99)
GLUCOSE SERPL-MCNC: 334 MG/DL (ref 65–99)
HCT VFR BLD AUTO: 40.6 % (ref 42–52)
HGB BLD-MCNC: 13.2 G/DL (ref 14–18)
IMM GRANULOCYTES # BLD AUTO: 0.15 K/UL (ref 0–0.11)
IMM GRANULOCYTES NFR BLD AUTO: 1.1 % (ref 0–0.9)
LYMPHOCYTES # BLD AUTO: 1.01 K/UL (ref 1–4.8)
LYMPHOCYTES NFR BLD: 7.3 % (ref 22–41)
MCH RBC QN AUTO: 30.1 PG (ref 27–33)
MCHC RBC AUTO-ENTMCNC: 32.5 G/DL (ref 32.3–36.5)
MCV RBC AUTO: 92.5 FL (ref 81.4–97.8)
MOG AB SER QL CBA IFA: NORMAL
MONOCYTES # BLD AUTO: 0.42 K/UL (ref 0–0.85)
MONOCYTES NFR BLD AUTO: 3 % (ref 0–13.4)
NEUTROPHILS # BLD AUTO: 12.19 K/UL (ref 1.82–7.42)
NEUTROPHILS NFR BLD: 88.5 % (ref 44–72)
NRBC # BLD AUTO: 0 K/UL
NRBC BLD-RTO: 0 /100 WBC (ref 0–0.2)
PLATELET # BLD AUTO: 360 K/UL (ref 164–446)
PMV BLD AUTO: 10 FL (ref 9–12.9)
POTASSIUM SERPL-SCNC: 4.7 MMOL/L (ref 3.6–5.5)
RBC # BLD AUTO: 4.39 M/UL (ref 4.7–6.1)
SODIUM SERPL-SCNC: 135 MMOL/L (ref 135–145)
WBC # BLD AUTO: 13.8 K/UL (ref 4.8–10.8)

## 2023-08-12 PROCEDURE — 36415 COLL VENOUS BLD VENIPUNCTURE: CPT

## 2023-08-12 PROCEDURE — 80048 BASIC METABOLIC PNL TOTAL CA: CPT

## 2023-08-12 PROCEDURE — A9270 NON-COVERED ITEM OR SERVICE: HCPCS | Performed by: HOSPITALIST

## 2023-08-12 PROCEDURE — A9579 GAD-BASE MR CONTRAST NOS,1ML: HCPCS | Performed by: INTERNAL MEDICINE

## 2023-08-12 PROCEDURE — 82962 GLUCOSE BLOOD TEST: CPT | Mod: 91

## 2023-08-12 PROCEDURE — 99232 SBSQ HOSP IP/OBS MODERATE 35: CPT | Performed by: INTERNAL MEDICINE

## 2023-08-12 PROCEDURE — 700111 HCHG RX REV CODE 636 W/ 250 OVERRIDE (IP): Performed by: INTERNAL MEDICINE

## 2023-08-12 PROCEDURE — 700117 HCHG RX CONTRAST REV CODE 255: Performed by: INTERNAL MEDICINE

## 2023-08-12 PROCEDURE — 770006 HCHG ROOM/CARE - MED/SURG/GYN SEMI*

## 2023-08-12 PROCEDURE — 85025 COMPLETE CBC W/AUTO DIFF WBC: CPT

## 2023-08-12 PROCEDURE — 70553 MRI BRAIN STEM W/O & W/DYE: CPT

## 2023-08-12 PROCEDURE — 700102 HCHG RX REV CODE 250 W/ 637 OVERRIDE(OP): Performed by: HOSPITALIST

## 2023-08-12 PROCEDURE — 700102 HCHG RX REV CODE 250 W/ 637 OVERRIDE(OP): Performed by: INTERNAL MEDICINE

## 2023-08-12 RX ORDER — INSULIN LISPRO 100 [IU]/ML
35 INJECTION, SOLUTION INTRAVENOUS; SUBCUTANEOUS
Status: DISCONTINUED | OUTPATIENT
Start: 2023-08-12 | End: 2023-08-12

## 2023-08-12 RX ORDER — INSULIN LISPRO 100 [IU]/ML
3-14 INJECTION, SOLUTION INTRAVENOUS; SUBCUTANEOUS
Status: DISCONTINUED | OUTPATIENT
Start: 2023-08-12 | End: 2023-08-12

## 2023-08-12 RX ORDER — DEXTROSE MONOHYDRATE 25 G/50ML
25 INJECTION, SOLUTION INTRAVENOUS
Status: DISCONTINUED | OUTPATIENT
Start: 2023-08-12 | End: 2023-08-12

## 2023-08-12 RX ORDER — DEXTROSE MONOHYDRATE 25 G/50ML
25 INJECTION, SOLUTION INTRAVENOUS
Status: DISCONTINUED | OUTPATIENT
Start: 2023-08-12 | End: 2023-08-13 | Stop reason: HOSPADM

## 2023-08-12 RX ADMIN — POLYETHYLENE GLYCOL 3350 1 PACKET: 17 POWDER, FOR SOLUTION ORAL at 16:38

## 2023-08-12 RX ADMIN — ROSUVASTATIN CALCIUM 5 MG: 5 TABLET, FILM COATED ORAL at 21:18

## 2023-08-12 RX ADMIN — INSULIN HUMAN 10 UNITS: 100 INJECTION, SOLUTION PARENTERAL at 17:00

## 2023-08-12 RX ADMIN — INSULIN HUMAN 10 UNITS: 100 INJECTION, SOLUTION PARENTERAL at 12:17

## 2023-08-12 RX ADMIN — SENNOSIDES AND DOCUSATE SODIUM 2 TABLET: 50; 8.6 TABLET ORAL at 17:00

## 2023-08-12 RX ADMIN — GADOTERIDOL 20 ML: 279.3 INJECTION, SOLUTION INTRAVENOUS at 11:36

## 2023-08-12 RX ADMIN — INSULIN HUMAN 10 UNITS: 100 INJECTION, SOLUTION PARENTERAL at 21:28

## 2023-08-12 RX ADMIN — SPIRONOLACTONE 37.5 MG: 25 TABLET ORAL at 05:31

## 2023-08-12 RX ADMIN — PREDNISONE 80 MG: 50 TABLET ORAL at 05:31

## 2023-08-12 RX ADMIN — SENNOSIDES AND DOCUSATE SODIUM 2 TABLET: 50; 8.6 TABLET ORAL at 05:31

## 2023-08-12 RX ADMIN — OLMESARTAN MEDOXOMIL 20 MG: 20 TABLET, FILM COATED ORAL at 21:18

## 2023-08-12 RX ADMIN — CARVEDILOL 25 MG: 6.25 TABLET, FILM COATED ORAL at 05:31

## 2023-08-12 RX ADMIN — INSULIN LISPRO 35 UNITS: 100 INJECTION, SOLUTION INTRAVENOUS; SUBCUTANEOUS at 08:45

## 2023-08-12 RX ADMIN — CARVEDILOL 25 MG: 6.25 TABLET, FILM COATED ORAL at 16:59

## 2023-08-12 RX ADMIN — INSULIN LISPRO 10 UNITS: 100 INJECTION, SOLUTION INTRAVENOUS; SUBCUTANEOUS at 08:47

## 2023-08-12 ASSESSMENT — PAIN DESCRIPTION - PAIN TYPE
TYPE: SURGICAL PAIN
TYPE: SURGICAL PAIN
TYPE: ACUTE PAIN
TYPE: SURGICAL PAIN
TYPE: ACUTE PAIN
TYPE: SURGICAL PAIN

## 2023-08-12 ASSESSMENT — ENCOUNTER SYMPTOMS
HEADACHES: 0
BLURRED VISION: 1
SHORTNESS OF BREATH: 0
FOCAL WEAKNESS: 0
EYE PAIN: 0
ABDOMINAL PAIN: 0

## 2023-08-12 ASSESSMENT — LIFESTYLE VARIABLES: SUBSTANCE_ABUSE: 0

## 2023-08-12 NOTE — PROGRESS NOTES
Hospital Medicine Daily Progress Note    Date of Service  8/12/2023    Chief Complaint  Ace Curry is a 58 y.o. male admitted 8/9/2023 with vision loss    Hospital Course  59 yo man with DM, HTN who was seen by ophthalmology for progressive right eye vision loss over 2 weeks.  He was seen by Dr. Jacques Torres retina specialists.  He had acute worsening of his right eye vision and ESR was noted to be 44.  He also had a mild leukocytosis with negative syphilis screen.  Dr. Torres spoke with Dr. Gomez who suggested the patient go to the ER for possible giant cell arteritis.  He had MRI that was negative for optic neuritis.  Here, ESR was 21 and CRP 1.61. Neurology was consulted.    Interval Problem Update  Glucose remains in 300s.  Leukocytosis decreased to 13.8.  Remains afebrile.  Further adjusting glargine and mealtime insulin.  He has somewhat frequent polyuria, he is drinking lots of fluids.  MRI brain showed an area of demyelination.  Lumbar puncture is pending.  His vision doing a little bit better, able to see more numbers on the clock across his room.  Denies any headache.    I have discussed this patient's plan of care and discharge plan at IDT rounds today with Case Management, Nursing, Nursing leadership, and other members of the IDT team.    Consultants/Specialty  neurology    Code Status  Full Code    Disposition  The patient is not medically cleared for discharge to home or a post-acute facility.  Anticipate discharge to: home with close outpatient follow-up    I have placed the appropriate orders for post-discharge needs.    Review of Systems  Review of Systems   HENT:          Denies jaw pain   Eyes:  Positive for blurred vision. Negative for pain.   Respiratory:  Negative for shortness of breath.    Cardiovascular:  Negative for chest pain.   Gastrointestinal:  Negative for abdominal pain.   Neurological:  Negative for focal weakness and headaches.   Psychiatric/Behavioral:  Negative for  substance abuse.         Physical Exam  Temp:  [36.2 °C (97.1 °F)-36.5 °C (97.7 °F)] 36.5 °C (97.7 °F)  Pulse:  [64-80] 64  Resp:  [16-20] 18  BP: (124-148)/(63-80) 147/78  SpO2:  [91 %-95 %] 91 %    Physical Exam  Vitals and nursing note reviewed.   Constitutional:       General: He is not in acute distress.     Appearance: He is not toxic-appearing.   HENT:      Head: Normocephalic.      Mouth/Throat:      Mouth: Mucous membranes are moist.   Eyes:      General:         Right eye: No discharge.         Left eye: No discharge.      Pupils: Pupils are equal, round, and reactive to light.      Comments: Right eye will contact hand in front of face and able to identify number of fingers   Cardiovascular:      Rate and Rhythm: Normal rate and regular rhythm.   Pulmonary:      Effort: Pulmonary effort is normal. No respiratory distress.      Breath sounds: No wheezing or rales.   Abdominal:      General: There is no distension.   Musculoskeletal:         General: No swelling.      Cervical back: Neck supple.   Skin:     General: Skin is warm and dry.   Neurological:      Mental Status: He is alert and oriented to person, place, and time.      Comments: No facial droop, normal speech.  Equal strength upper and lower extremities         Fluids    Intake/Output Summary (Last 24 hours) at 8/12/2023 1504  Last data filed at 8/12/2023 0350  Gross per 24 hour   Intake 320 ml   Output 0 ml   Net 320 ml       Laboratory  Recent Labs     08/10/23  0045 08/11/23  0711 08/12/23  0514   WBC 14.6* 18.8* 13.8*   RBC 4.49* 4.39* 4.39*   HEMOGLOBIN 14.0 13.4* 13.2*   HEMATOCRIT 41.2* 40.7* 40.6*   MCV 91.8 92.7 92.5   MCH 31.2 30.5 30.1   MCHC 34.0 32.9 32.5   RDW 45.9 46.2 47.0   PLATELETCT 387 399 360   MPV 10.1 10.4 10.0     Recent Labs     08/10/23  1650 08/11/23  0711 08/12/23  0105   SODIUM 130* 132* 135   POTASSIUM 4.6 4.9 4.7   CHLORIDE 96 101 102   CO2 17* 18* 19*   GLUCOSE 378* 376* 334*   BUN 27* 28* 29*   CREATININE 1.24  1.09 1.23   CALCIUM 9.9 9.5 9.5     Recent Labs     08/10/23  1650   APTT 28.6   INR 1.08               Imaging  MR-BRAIN-WITH & W/O   Final Result      1.  Overall mild white matter changes with a prominent ovoid area of abnormal white matter signal in the LEFT periventricular frontal lobe which remains nonspecific but which could indicate demyelinating disease   2.  Mild atrophy   3.  Tiny areas of encephalomalacia in the LEFT thalamus and RIGHT posterior limb internal capsule, likely areas of remote lacunar infarction      US-TEMPORAL ARTERY DUPLEX BILAT   Final Result      CT-CTA NECK WITH & W/O-POST PROCESSING   Final Result      CT angiogram of the neck within normal limits.      CT-CTA HEAD WITH & W/O-POST PROCESS   Final Result      CT angiogram of the [place] within normal limits.      DX-LUMBAR PUNCTURE FOR DIAGNOSIS    (Results Pending)        Assessment/Plan  * Vision loss of right eye- (present on admission)  Assessment & Plan  Unclear etiology  outpatient workup ACE level Borrelia serology Toxoplasma antibodies Bartonella as antibodies QuantiFERON treponema antibodies were all negative  MRI of the orbits on 8/1/2023 revealed no convincing evidence of optic neuritis   Neurology consulted, CTA head and neck was negative for significant occlusion.  MRI brain showed area of demyelination, I discussed with Dr. Maldonado who will review the images.  ESR was previously 44 with concerns for temporal arteritis.  Had temporal artery biopsy today, f/u path  I spoke with Dr. Gomez, additional IV Solu-Medrol today then plan for prednisone1 mg/kg/day and he will follow-up with an appointment on Monday at 7:30 AM in clinic  Bedside lumbar puncture unsuccessful, awaiting for radiology assistance for LP  MOG is negative and aquaporin is pending    Dyslipidemia  Assessment & Plan  Continue rosuvastatin    Essential hypertension  Assessment & Plan  Continue carvedilol olmesartan spironolactone  Intermittently  hypertensive    Type 2 diabetes mellitus, with long-term current use of insulin ([facility])  Assessment & Plan  Uncontrolled, glucose 300s  Increase mealtime insulin to 40 units with high ISS  Increase long-acting to 30U BID  Followed by Dr. Gibbons with endocrinology         VTE prophylaxis:   SCDs/TEDs      I have performed a physical exam and reviewed and updated ROS and Plan today (8/12/2023). In review of yesterday's note (8/11/2023), there are no changes except as documented above.

## 2023-08-12 NOTE — CARE PLAN
Problem: Knowledge Deficit - Standard  Goal: Patient and family/care givers will demonstrate understanding of plan of care, disease process/condition, diagnostic tests and medications  Outcome: Progressing     Problem: Psychosocial  Goal: Patient's level of anxiety will decrease  Outcome: Progressing  Goal: Patient's ability to verbalize feelings about condition will improve  Outcome: Progressing  Goal: Patient's ability to re-evaluate and adapt role responsibilities will improve  Outcome: Progressing  Goal: Patient and family will demonstrate ability to cope with life altering diagnosis and/or procedure  Outcome: Progressing  Goal: Spiritual and cultural needs incorporated into hospitalization  Outcome: Progressing     Problem: Communication  Goal: The ability to communicate needs accurately and effectively will improve  Outcome: Progressing     Problem: Discharge Barriers/Planning  Goal: Patient's continuum of care needs are met  Outcome: Progressing     Problem: Urinary Elimination  Goal: Establish and maintain regular urinary output  Outcome: Progressing     Problem: Bowel Elimination  Goal: Establish and maintain regular bowel function  Outcome: Progressing     Problem: Gastrointestinal Irritability  Goal: Nausea and vomiting will be absent or improve  Outcome: Progressing  Goal: Diarrhea will be absent or improved  Outcome: Progressing     Problem: Mobility  Goal: Patient's capacity to carry out activities will improve  Outcome: Progressing     Problem: Self Care  Goal: Patient will have the ability to perform ADLs independently or with assistance (bathe, groom, dress, toilet and feed)  Outcome: Progressing     Problem: Infection - Standard  Goal: Patient will remain free from infection  Outcome: Progressing     Problem: Fall Risk  Goal: Patient will remain free from falls  Outcome: Progressing   The patient is Stable - Low risk of patient condition declining or worsening    Shift Goals  Clinical Goals: LP.  biopsy  Patient Goals: drink water, LP  Family Goals: PINO    Progress made toward(s) clinical / shift goals:  manage pain, stable vs, no fevers, prog mob    Patient is not progressing towards the following goals: n/a

## 2023-08-13 ENCOUNTER — PHARMACY VISIT (OUTPATIENT)
Dept: PHARMACY | Facility: MEDICAL CENTER | Age: 58
End: 2023-08-13
Payer: COMMERCIAL

## 2023-08-13 ENCOUNTER — APPOINTMENT (OUTPATIENT)
Dept: RADIOLOGY | Facility: MEDICAL CENTER | Age: 58
DRG: 115 | End: 2023-08-13
Attending: INTERNAL MEDICINE
Payer: COMMERCIAL

## 2023-08-13 VITALS
BODY MASS INDEX: 36.44 KG/M2 | HEART RATE: 54 BPM | WEIGHT: 283.95 LBS | HEIGHT: 74 IN | RESPIRATION RATE: 18 BRPM | SYSTOLIC BLOOD PRESSURE: 167 MMHG | DIASTOLIC BLOOD PRESSURE: 78 MMHG | TEMPERATURE: 97.1 F | OXYGEN SATURATION: 96 %

## 2023-08-13 LAB
ANION GAP SERPL CALC-SCNC: 12 MMOL/L (ref 7–16)
APPEARANCE UR: CLEAR
BASOPHILS # BLD AUTO: 0.1 % (ref 0–1.8)
BASOPHILS # BLD: 0.02 K/UL (ref 0–0.12)
BILIRUB UR QL STRIP.AUTO: NEGATIVE
BUN SERPL-MCNC: 29 MG/DL (ref 8–22)
BURR CELLS/RBC NFR CSF MANUAL: 0 %
C GATTII+NEOFOR DNA CSF QL NAA+NON-PROBE: NOT DETECTED
CALCIUM SERPL-MCNC: 9.3 MG/DL (ref 8.5–10.5)
CHLORIDE SERPL-SCNC: 102 MMOL/L (ref 96–112)
CLARITY CSF: CLEAR
CMV DNA CSF QL NAA+NON-PROBE: NOT DETECTED
CO2 SERPL-SCNC: 22 MMOL/L (ref 20–33)
COLOR CSF: COLORLESS
COLOR SPUN CSF: COLORLESS
COLOR UR: YELLOW
CREAT SERPL-MCNC: 1.05 MG/DL (ref 0.5–1.4)
CSF COMMENTS 1658: NORMAL
E COLI K1 DNA CSF QL NAA+NON-PROBE: NOT DETECTED
EOSINOPHIL # BLD AUTO: 0 K/UL (ref 0–0.51)
EOSINOPHIL NFR BLD: 0 % (ref 0–6.9)
ERYTHROCYTE [DISTWIDTH] IN BLOOD BY AUTOMATED COUNT: 45.8 FL (ref 35.9–50)
EV RNA CSF QL NAA+NON-PROBE: NOT DETECTED
GFR SERPLBLD CREATININE-BSD FMLA CKD-EPI: 82 ML/MIN/1.73 M 2
GLUCOSE BLD STRIP.AUTO-MCNC: 190 MG/DL (ref 65–99)
GLUCOSE BLD STRIP.AUTO-MCNC: 208 MG/DL (ref 65–99)
GLUCOSE BLD STRIP.AUTO-MCNC: 277 MG/DL (ref 65–99)
GLUCOSE BLD STRIP.AUTO-MCNC: 284 MG/DL (ref 65–99)
GLUCOSE BLD STRIP.AUTO-MCNC: 314 MG/DL (ref 65–99)
GLUCOSE CSF-MCNC: 152 MG/DL (ref 40–80)
GLUCOSE SERPL-MCNC: 233 MG/DL (ref 65–99)
GLUCOSE UR STRIP.AUTO-MCNC: >=1000 MG/DL
GP B STREP DNA CSF QL NAA+NON-PROBE: NOT DETECTED
GRAM STN SPEC: NORMAL
HAEM INFLU DNA CSF QL NAA+NON-PROBE: NOT DETECTED
HCT VFR BLD AUTO: 40.2 % (ref 42–52)
HGB BLD-MCNC: 13.3 G/DL (ref 14–18)
HHV6 DNA CSF QL NAA+NON-PROBE: NOT DETECTED
HSV1 DNA CSF QL NAA+NON-PROBE: NOT DETECTED
HSV2 DNA CSF QL NAA+NON-PROBE: NOT DETECTED
IMM GRANULOCYTES # BLD AUTO: 0.15 K/UL (ref 0–0.11)
IMM GRANULOCYTES NFR BLD AUTO: 1 % (ref 0–0.9)
KETONES UR STRIP.AUTO-MCNC: NEGATIVE MG/DL
L MONOCYTOG DNA CSF QL NAA+NON-PROBE: NOT DETECTED
LEUKOCYTE ESTERASE UR QL STRIP.AUTO: NEGATIVE
LYMPHOCYTES # BLD AUTO: 1.22 K/UL (ref 1–4.8)
LYMPHOCYTES NFR BLD: 7.9 % (ref 22–41)
MCH RBC QN AUTO: 30.4 PG (ref 27–33)
MCHC RBC AUTO-ENTMCNC: 33.1 G/DL (ref 32.3–36.5)
MCV RBC AUTO: 92 FL (ref 81.4–97.8)
MICRO URNS: ABNORMAL
MONOCYTES # BLD AUTO: 1.53 K/UL (ref 0–0.85)
MONOCYTES NFR BLD AUTO: 9.9 % (ref 0–13.4)
N MEN DNA CSF QL NAA+NON-PROBE: NOT DETECTED
NEUTROPHILS # BLD AUTO: 12.47 K/UL (ref 1.82–7.42)
NEUTROPHILS NFR BLD: 81.1 % (ref 44–72)
NITRITE UR QL STRIP.AUTO: NEGATIVE
NRBC # BLD AUTO: 0 K/UL
NRBC BLD-RTO: 0 /100 WBC (ref 0–0.2)
NUC CELL # CSF: 5 CELLS/UL (ref 0–10)
PARECHOVIRUS A RNA CSF QL NAA+NON-PROBE: NOT DETECTED
PH UR STRIP.AUTO: 5.5 [PH] (ref 5–8)
PLATELET # BLD AUTO: 359 K/UL (ref 164–446)
PMV BLD AUTO: 10.3 FL (ref 9–12.9)
POTASSIUM SERPL-SCNC: 4.2 MMOL/L (ref 3.6–5.5)
PROT CSF-MCNC: 76 MG/DL (ref 15–45)
PROT UR QL STRIP: NEGATIVE MG/DL
RBC # BLD AUTO: 4.37 M/UL (ref 4.7–6.1)
RBC # CSF: 5097 CELLS/UL
RBC UR QL AUTO: NEGATIVE
S PNEUM DNA CSF QL NAA+NON-PROBE: NOT DETECTED
SIGNIFICANT IND 70042: NORMAL
SITE SITE: NORMAL
SODIUM SERPL-SCNC: 136 MMOL/L (ref 135–145)
SOURCE SOURCE: NORMAL
SP GR UR STRIP.AUTO: 1.03
SPECIMEN VOL CSF: 9 ML
TUBE # CSF: 3
TUBE # CSF: NORMAL
UROBILINOGEN UR STRIP.AUTO-MCNC: 0.2 MG/DL
VZV DNA CSF QL NAA+NON-PROBE: NOT DETECTED
WBC # BLD AUTO: 15.4 K/UL (ref 4.8–10.8)

## 2023-08-13 PROCEDURE — 89051 BODY FLUID CELL COUNT: CPT

## 2023-08-13 PROCEDURE — RXMED WILLOW AMBULATORY MEDICATION CHARGE: Performed by: INTERNAL MEDICINE

## 2023-08-13 PROCEDURE — 81003 URINALYSIS AUTO W/O SCOPE: CPT

## 2023-08-13 PROCEDURE — 85025 COMPLETE CBC W/AUTO DIFF WBC: CPT

## 2023-08-13 PROCEDURE — 36415 COLL VENOUS BLD VENIPUNCTURE: CPT

## 2023-08-13 PROCEDURE — A9270 NON-COVERED ITEM OR SERVICE: HCPCS | Performed by: HOSPITALIST

## 2023-08-13 PROCEDURE — 84157 ASSAY OF PROTEIN OTHER: CPT

## 2023-08-13 PROCEDURE — 700111 HCHG RX REV CODE 636 W/ 250 OVERRIDE (IP): Performed by: INTERNAL MEDICINE

## 2023-08-13 PROCEDURE — 82945 GLUCOSE OTHER FLUID: CPT

## 2023-08-13 PROCEDURE — 700102 HCHG RX REV CODE 250 W/ 637 OVERRIDE(OP): Performed by: HOSPITALIST

## 2023-08-13 PROCEDURE — 84166 PROTEIN E-PHORESIS/URINE/CSF: CPT

## 2023-08-13 PROCEDURE — 62328 DX LMBR SPI PNXR W/FLUOR/CT: CPT

## 2023-08-13 PROCEDURE — 87205 SMEAR GRAM STAIN: CPT

## 2023-08-13 PROCEDURE — 80048 BASIC METABOLIC PNL TOTAL CA: CPT

## 2023-08-13 PROCEDURE — 86780 TREPONEMA PALLIDUM: CPT

## 2023-08-13 PROCEDURE — 87070 CULTURE OTHR SPECIMN AEROBIC: CPT

## 2023-08-13 PROCEDURE — 87483 CNS DNA AMP PROBE TYPE 12-25: CPT

## 2023-08-13 PROCEDURE — 82962 GLUCOSE BLOOD TEST: CPT | Mod: 91

## 2023-08-13 PROCEDURE — 99239 HOSP IP/OBS DSCHRG MGMT >30: CPT | Performed by: INTERNAL MEDICINE

## 2023-08-13 RX ORDER — DIPHENHYDRAMINE HYDROCHLORIDE 25 MG/1
CAPSULE, LIQUID FILLED ORAL
Qty: 1 KIT | Refills: 0 | Status: SHIPPED | OUTPATIENT
Start: 2023-08-13

## 2023-08-13 RX ORDER — LANCETS 30 GAUGE
EACH MISCELLANEOUS
Qty: 100 EACH | Refills: 0 | Status: SHIPPED | OUTPATIENT
Start: 2023-08-13

## 2023-08-13 RX ORDER — GLUCOSAMINE HCL/CHONDROITIN SU 500-400 MG
CAPSULE ORAL
Qty: 100 EACH | Refills: 0 | Status: SHIPPED | OUTPATIENT
Start: 2023-08-13

## 2023-08-13 RX ORDER — PREDNISONE 20 MG/1
TABLET ORAL
Qty: 28 TABLET | Refills: 1 | Status: SHIPPED | OUTPATIENT
Start: 2023-08-14

## 2023-08-13 RX ORDER — INSULIN GLARGINE 100 [IU]/ML
30 INJECTION, SOLUTION SUBCUTANEOUS EVERY EVENING
Qty: 6 ML | Refills: 1 | Status: SHIPPED | OUTPATIENT
Start: 2023-08-13 | End: 2023-08-13

## 2023-08-13 RX ADMIN — SENNOSIDES AND DOCUSATE SODIUM 2 TABLET: 50; 8.6 TABLET ORAL at 05:06

## 2023-08-13 RX ADMIN — PREDNISONE 80 MG: 50 TABLET ORAL at 05:06

## 2023-08-13 RX ADMIN — MAGNESIUM HYDROXIDE 30 ML: 1200 LIQUID ORAL at 05:06

## 2023-08-13 RX ADMIN — SPIRONOLACTONE 37.5 MG: 25 TABLET ORAL at 05:07

## 2023-08-13 RX ADMIN — CARVEDILOL 25 MG: 6.25 TABLET, FILM COATED ORAL at 05:06

## 2023-08-13 ASSESSMENT — PAIN DESCRIPTION - PAIN TYPE
TYPE: ACUTE PAIN

## 2023-08-13 NOTE — PROGRESS NOTES
Patient reports that vision loss in his right eye seems to be improving described as 25% better then when he came into the hospital.  Denies any new neurologic symptoms.  Has been getting steroids this admission; seemingly tolerating well.  Is status post fluoroscopic guided lumbar puncture for CSF.  Meningitis/encephalitis CSF panel by PCR was negative.  No organisms on Gram stain.  Glucose elevated at 152 and protein elevated at 76. TNC within normal limits.  Patient is currently laying flat post-procedure; no headache.  MRI brain with and without contrast revealed a prominent ovoid area of abnormal white matter signal in the left periventricular frontal region.  This is a nonspecific finding.  According to the patient and the patient's wife there was a similarly reported finding on the MRI of the brain completed at [facility] about 4 years ago.  Although the report and imaging are not available for my review at this time.  No additional acute inpatient neurology recommendations at this time.  Has a neuro-ophthalmology outpatient appointment tomorrow at 0730.

## 2023-08-13 NOTE — DISCHARGE SUMMARY
Discharge Summary    CHIEF COMPLAINT ON ADMISSION  Chief Complaint   Patient presents with    Loss of Vision     Complete loss of vision to R eye since 7/27/23.  Pt sent to ED by eye doctor       Reason for Admission  Sent by MD     Admission Date  8/9/2023    CODE STATUS  Full Code    HPI & HOSPITAL COURSE  59 yo man with DM, HTN who was seen by ophthalmology for progressive right eye vision loss over 2 weeks.  He was seen by Dr. Jacques Torres retina specialists with unrevealing outpatient work-up.  MRI orbit was negative for optic neuritis.  He had acute worsening of his right eye vision and ESR was noted to be 44.  He also had a mild leukocytosis with negative syphilis screen.  Dr. Torres spoke with Dr. Gomez who suggested the patient go to the ER for possible giant cell arteritis. Here, ESR was 21 and CRP 1.61.  He was started on Solu-Medrol 1 g daily with improvement of his vision.  He had increased leukocytosis while on steroids, no localizing signs of infection and afebrile.  He had a UA that was negative for infection.  I consulted neurology and I spoke with Dr. Torres and Dr. Gomez.  Temporal artery ultrasound was negative for stenosis or halo effect.  Vascular surgery performed temporal artery biopsy and pathology is pending.  MRI brain showed mild ovoid area with unclear clinical significance, okay to follow-up as outpatient per neurology.  He underwent LP that showed increased glucose and protein, low WBCs and negative meningitis/encephalitis panel. MOG IGG was negative.  Patient continued to have vision improvement so he will continue on high-dose prednisone and follow-up with Dr. Gomez tomorrow morning.  I have also placed referral to follow-up with neurology outpatient.  For his diabetes, he was hyperglycemic to 300s with initiation of steroids.  His hyperglycemia slowly improved.  I discussed extensively with the patient and wife, and we decided to have him discharged on his home regimen of  insulin and he will follow-up with his endocrinologist in the next couple days.     Therefore, he is discharged in good and stable condition to home with close outpatient follow-up.    The patient met 2-midnight criteria for an inpatient stay at the time of discharge.    Discharge Date  8/13    FOLLOW UP ITEMS POST DISCHARGE  Aquaporin AB pending  F/u CSF oligoclonal bands, treponema antibody, protein electrophoresis, culture  Follow-up temporal artery biopsy pathology  Further prednisone management per neuro-ophthalmology  Follow-up endocrinology for glucose management, will likely need further titration depending on his steroid treatment    DISCHARGE DIAGNOSES  Principal Problem:    Vision loss of right eye (POA: Yes)  Active Problems:    Type 2 diabetes mellitus, with long-term current use of insulin (HCC) (POA: Unknown)    Essential hypertension (POA: Unknown)    Dyslipidemia (POA: Unknown)  Resolved Problems:    * No resolved hospital problems. *      FOLLOW UP  Ryne Gomez M.D.  [address] [facility] [address]  [place] [address]  [phone]    Follow up  Tomorrow at 7:30 AM    Tanika Gibbons M.D.  [address] [facility] [address] [address] [facility] [facility] [address]  [place] [address]    Follow up  Follow-up this week    [facility]UR[facility]  [address]  [place] [place] [facility] [address]  [phone]  Follow up in 1 week(s)  Referral has been placed for you to follow-up with outpatient neurology      MEDICATIONS ON DISCHARGE     Medication List        START taking these medications        Instructions   Alcohol Swabs   Doctor's comments: Per formulary preference. ICD-10 code: E11.65 Uncontrolled type 2 Diabetes Mellitus  Wipe site with prep pad prior to injection.     * Blood Glucose Meter Kit   Doctor's comments: Or per formulary preference. ICD-10 code: E11.65 Uncontrolled type 2 Diabetes Mellitus  Test blood sugar as recommended by provider. One Touch Verio Flex blood glucose monitoring kit.     * Blood Glucose Test Strips   Doctor's  comments: Or per formulary preference. ICD-10 code: E11.65 Uncontrolled type 2 Diabetes Mellitus  Use one One Touch Verio Flex strip to test blood sugar three times daily before meals.     Lancets   Doctor's comments: Or per formulary preference. ICD-10 code: E11.65 Uncontrolled type 2 Diabetes Mellitus  Use one One Touch Verio Flex lancet to test blood sugar three times daily before meals.     predniSONE 20 MG Tabs  Start taking on: August 14, 2023  Commonly known as: Deltasone   Take 4 tablets orally daily and follow up with Dr. Gomez for further management           * This list has 2 medication(s) that are the same as other medications prescribed for you. Read the directions carefully, and ask your doctor or other care provider to review them with you.                CHANGE how you take these medications        Instructions   Insulin Regular Human (Conc) 500 UNIT/ML Sopn  What changed: Another medication with the same name was removed. Continue taking this medication, and follow the directions you see here.   Inject  Units under the skin 3 times a day before meals. Per sliding scale  Dose:  Units            CONTINUE taking these medications        Instructions   Aspirin 81 81 MG EC tablet  Generic drug: aspirin   Take 81 mg by mouth every day.  Dose: 81 mg     carvedilol 25 MG Tabs  Commonly known as: Coreg   Take 25 mg by mouth 2 times a day.  Dose: 25 mg     MAGNESIUM PO   Take 1 Tablet by mouth every day.  Dose: 1 Tablet     NON SPECIFIED   Take 1 Tablet by mouth every day. Emu- helps with inflamation  Dose: 1 Tablet     olmesartan 20 MG Tabs  Commonly known as: Benicar   Take 20 mg by mouth every evening.  Dose: 20 mg     rosuvastatin 5 MG Tabs  Commonly known as: Crestor   Take 5 mg by mouth every evening.  Dose: 5 mg     spironolactone 25 MG Tabs  Commonly known as: Aldactone   Take 37.5 mg by mouth every day. 37.5mg = 1 and 1/2 tab  Dose: 37.5 mg     VITAMIN B 12 PO   Take 1 Tablet by  mouth every day.  Dose: 1 Tablet     VITAMIN D PO   Take 1 Tablet by mouth every day.  Dose: 1 Tablet              Allergies  Allergies   Allergen Reactions    Farxiga [Dapagliflozin] Unspecified     Ketoacidosis        DIET  Orders Placed This Encounter   Procedures    Diet Order Diet: Consistent CHO (Diabetic)     Standing Status:   Standing     Number of Occurrences:   1     Order Specific Question:   Diet:     Answer:   Consistent CHO (Diabetic) [4]       ACTIVITY  As tolerated.    CONSULTATIONS  Neurology, discussed with Dr. Gomez    PROCEDURES  DX-LUMBAR PUNCTURE FOR DIAGNOSIS   Final Result      Fluoroscopic-guided lumbar puncture as described above.      MR-BRAIN-WITH & W/O   Final Result      1.  Overall mild white matter changes with a prominent ovoid area of abnormal white matter signal in the LEFT periventricular frontal lobe which remains nonspecific but which could indicate demyelinating disease   2.  Mild atrophy   3.  Tiny areas of encephalomalacia in the LEFT thalamus and RIGHT posterior limb internal capsule, likely areas of remote lacunar infarction      US-TEMPORAL ARTERY DUPLEX BILAT   Final Result      CT-CTA NECK WITH & W/O-POST PROCESSING   Final Result      CT angiogram of the neck within normal limits.      CT-CTA HEAD WITH & W/O-POST PROCESS   Final Result      CT angiogram of the [place] within normal limits.            LABORATORY  Lab Results   Component Value Date    SODIUM 136 08/13/2023    POTASSIUM 4.2 08/13/2023    CHLORIDE 102 08/13/2023    CO2 22 08/13/2023    GLUCOSE 233 (H) 08/13/2023    BUN 29 (H) 08/13/2023    CREATININE 1.05 08/13/2023    GLOMRATE 80 07/30/2023        Lab Results   Component Value Date    WBC 15.4 (H) 08/13/2023    HEMOGLOBIN 13.3 (L) 08/13/2023    HEMATOCRIT 40.2 (L) 08/13/2023    PLATELETCT 359 08/13/2023        Total time of the discharge process exceeds 40 minutes.

## 2023-08-13 NOTE — OR SURGEON
EXAMINATION:  8/13/2023 11:00 AM    HISTORY/REASON FOR EXAM:  right eye vision loss, diagnostic LP, unsuccessful bedside attempt.  Right ocular vision loss for 2 weeks    TECHNIQUE/EXAM DESCRIPTION AND NUMBER OF VIEWS:  Fluoroscopic-guided lumbar puncture.    0 fluoroscopic images obtained.    Fluoroscopy time: 0.6 minutes    Fluoroscopy dose(DAP): 4.045 Gy*cm^2    PROCEDURE:     Informed consent was obtained. A timeout was taken. With the patient in prone position, the lumbar region was prepped and draped in a sterile manner. Following local anesthesia with 1% lidocaine, a 20-gauge spinal needle was advanced into the subarachnoid space at the L2 level.  Approximately 7.0 cc of CSF was collected and the specimens sent to the lab for the studies requested. The patient tolerated the procedure well with no evidence of complication. No blood loss.    IMPRESSION:  Fluoroscopic-guided lumbar puncture as described above.

## 2023-08-13 NOTE — CARE PLAN
Problem: Psychosocial  Goal: Patient's ability to re-evaluate and adapt role responsibilities will improve  Description: Target End Date:  Prior to discharge or change in level of care    1.  Assess family support  2.  Encourage support system participation in care  3.  Encouraged verbalization of feelings regarding caregiver responsibilities  4.  Discuss changes in role and responsibilities caused by patient's condition  Outcome: Progressing   The patient is Watcher - Medium risk of patient condition declining or worsening    Shift Goals  Clinical Goals: lp mri  Patient Goals: lp mri  Family Goals: yuliana    Progress made toward(s) clinical / shift goals:      Patient is not progressing towards the following goals:

## 2023-08-13 NOTE — CARE PLAN
The patient is Stable - Low risk of patient condition declining or worsening    Shift Goals  Clinical Goals: stable neuro checks, improvement in vision  Patient Goals: LP, sleep  Family Goals: PINO    Progress made toward(s) clinical / shift goals:      Problem: Self Care  Goal: Patient will have the ability to perform ADLs independently or with assistance (bathe, groom, dress, toilet and feed)  Outcome: Progressing  Pt is able to perform ADLs independently/with minimal assistance.     Problem: Knowledge Deficit - Standard  Goal: Patient and family/care givers will demonstrate understanding of plan of care, disease process/condition, diagnostic tests and medications  Outcome: Progressing  Educated pt on the plan of care, and on LP that will take place. Pt demonstrates an understanding of the plan of care at this time.      Patient is not progressing towards the following goals:  Problem: Bowel Elimination  Goal: Establish and maintain regular bowel function  Outcome: Not Progressing  Pt's last BM was PTA. PRN bowel medications are being given. Pt encouraged to mobilize and hydrate.

## 2023-08-13 NOTE — PROGRESS NOTES
Assumed patient care at 1915. Patient is alert and oriented x 4. Denies pain or discomfort. Bed in lowest position and locked. Call light within reach. Pt continues to have visual deficits in the right eye. Peripheral vision is intact, pt states that their vision on the right side is 'blurry and darker.'     Pt refusing bed alarm at this time. Educated pt that it is recommended for their safety to have a bed alarm and to have assistance when ambulating to the bathroom, as having a recent change in their vision puts them at a higher risk of falling. Pt continues to not call for assistance when ambulating. Pt states that they understand the risks. Pt continues to be Aox4. Charge RN, Juan Manuel, notified and aware.

## 2023-08-13 NOTE — DISCHARGE INSTRUCTIONS
Discharge Instructions per Young Najera M.D.    DIAGNOSIS: You have right eye vision loss with elevated white blood cell count.  Blood tests, lumbar puncture tests were sent.  MRI of your brain was done.  He had a biopsy of your temporal artery.  Please follow-up with Dr. Gomez tomorrow at 7:30 AM for results and next steps.  You will continue on prednisone once a day until you see him regarding further dosing.  Because of your hyperglycemia while on steroids, please follow-up with your endocrinologist next week.

## 2023-08-14 ENCOUNTER — OFFICE VISIT (OUTPATIENT)
Dept: OPHTHALMOLOGY | Facility: MEDICAL CENTER | Age: 58
End: 2023-08-14
Payer: COMMERCIAL

## 2023-08-14 ENCOUNTER — PHARMACY VISIT (OUTPATIENT)
Dept: PHARMACY | Facility: MEDICAL CENTER | Age: 58
End: 2023-08-14

## 2023-08-14 DIAGNOSIS — Z79.4 TYPE 2 DIABETES MELLITUS WITH HYPERGLYCEMIA, WITH LONG-TERM CURRENT USE OF INSULIN (HCC): ICD-10-CM

## 2023-08-14 DIAGNOSIS — E11.65 TYPE 2 DIABETES MELLITUS WITH HYPERGLYCEMIA, WITH LONG-TERM CURRENT USE OF INSULIN (HCC): ICD-10-CM

## 2023-08-14 DIAGNOSIS — H46.9 OPTIC NEURITIS: ICD-10-CM

## 2023-08-14 LAB — AQP4 H2O CHANNEL AB SERPL IA-ACNC: <1.5 U/ML

## 2023-08-14 PROCEDURE — RXOTC WILLOW AMBULATORY OTC CHARGE: Performed by: PHARMACIST

## 2023-08-14 PROCEDURE — 99205 OFFICE O/P NEW HI 60 MIN: CPT | Mod: 25 | Performed by: OPHTHALMOLOGY

## 2023-08-14 PROCEDURE — 92250 FUNDUS PHOTOGRAPHY W/I&R: CPT | Performed by: OPHTHALMOLOGY

## 2023-08-14 RX ORDER — OMEPRAZOLE 20 MG/1
20 CAPSULE, DELAYED RELEASE ORAL 2 TIMES DAILY
Qty: 60 CAPSULE | Refills: 1 | Status: SHIPPED | OUTPATIENT
Start: 2023-08-14

## 2023-08-14 ASSESSMENT — VISUAL ACUITY
METHOD: SNELLEN - LINEAR
OS_CC: 20/25
OS_CC+: -2
OD_CC: 20/40
CORRECTION_TYPE: GLASSES

## 2023-08-14 ASSESSMENT — REFRACTION_WEARINGRX
OD_AXIS: 006
SPECS_TYPE: PAL
OS_CYLINDER: +1.00
OS_SPHERE: -0.50
OD_CYLINDER: +1.50
OS_AXIS: 162
OS_ADD: +1.75
OD_ADD: +1.00
OD_SPHERE: -1.00

## 2023-08-14 ASSESSMENT — CONF VISUAL FIELD
OD_INFERIOR_TEMPORAL_RESTRICTION: 0
OS_SUPERIOR_TEMPORAL_RESTRICTION: 0
OS_INFERIOR_TEMPORAL_RESTRICTION: 0
OS_NORMAL: 1
OD_NORMAL: 1
OS_INFERIOR_NASAL_RESTRICTION: 0
OD_INFERIOR_NASAL_RESTRICTION: 0
OD_SUPERIOR_TEMPORAL_RESTRICTION: 0
OD_SUPERIOR_NASAL_RESTRICTION: 0
OS_SUPERIOR_NASAL_RESTRICTION: 0

## 2023-08-14 ASSESSMENT — REFRACTION_MANIFEST
OD_SPHERE: -0.75
METHOD_AUTOREFRACTION: 1
OS_SPHERE: -0.25
OS_CYLINDER: +0.75
OD_AXIS: 175
OS_AXIS: 165
OD_CYLINDER: +1.25

## 2023-08-14 ASSESSMENT — TONOMETRY
OS_IOP_MMHG: 14
OD_IOP_MMHG: 13
IOP_METHOD: I-CARE

## 2023-08-14 ASSESSMENT — CUP TO DISC RATIO
OS_RATIO: 0.0
OD_RATIO: 0.0

## 2023-08-14 ASSESSMENT — SLIT LAMP EXAM - LIDS
COMMENTS: NORMAL
COMMENTS: NORMAL

## 2023-08-14 ASSESSMENT — EXTERNAL EXAM - RIGHT EYE: OD_EXAM: NORMAL

## 2023-08-14 ASSESSMENT — EXTERNAL EXAM - LEFT EYE: OS_EXAM: NORMAL

## 2023-08-14 NOTE — ASSESSMENT & PLAN NOTE
8/14/2023-has slowly progressive vision loss from July 27, 2023 this was over 10 days.  Presented to [facility] where his vision was done to hand motions.  Was admitted to [facility] for work-up including MRI scan that was nondiagnostic for any optic nerve abnormality.  He did receive 4 days of high-dose intravenous steroids.  Because of headaches he also underwent a temporal artery biopsy.  He did undergo a lumbar puncture test but there were numerous red blood cells.  His Mog was negative.  He also had a CT angiogram that was negative. On exam bilateral optic nerve heads appear healthy.  OCT neurofibrillary thickness is 98 OD 88 OS.  Therefore this all suggests a possible retrobulbar optic neuritis.  However his MRI scan was nondiagnostic.   He currently is on 80 mg of prednisone today and his visual acuity has improved to 20/40.  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 Davis in [place] and was treated with IVIG.  This is because of lower extremity weakness and numbness.  He was then evaluated at [place] who did not not confirm the diagnosis.  However because he has another event consistent with possible demyelinating disease, MS is still in the differential and the oligoclonal bans are pending long with further testing including aqua porn 4 antibody, temporal artery biopsy results, and other laboratories.  At this time we will slowly go down on the prednisone 10 mg/day and reevaluate next week.  He was referred to neurology as an outpatient. I discussed that if on the steroid taper his vision starts to deteriorate then we will need to increase his prednisone possibly re-admitting him for another round of intravenous.

## 2023-08-14 NOTE — ASSESSMENT & PLAN NOTE
8/14/2023-preproliferative diabetic retinopathy.  Status post bilateral panretinal photocoagulation by [place] [facility].

## 2023-08-14 NOTE — PROCEDURES
Sharp discs OU, no overt swelling, preproliferative diabetic retinopathy with Ruby, panretinal photocoagulation

## 2023-08-14 NOTE — PROGRESS NOTES
Peds/Neuro Ophthalmology:   Ryne Gomez M.D.    Date & Time note created:    8/14/2023   12:51 PM     Referring MD / APRN:  Pcp Pt States None, No att. providers found    Patient ID:  Name:             Ace Curry   YOB: 1965  Age:                 58 y.o.  male   MRN:               [id number]    Chief Complaint/Reason for Visit:     Other (New patient eval for an urgent appointment )      History of Present Illness:    Ace Curry is a 58 y.o. male   New patient eval for an urgent appointment. Pt is with wife today. No headaches. No pain or discomfort OU. Pt states that be woke up on 7/27/23 and noticed a blind spot or black sliver as he describes it, starting at the bottom of his vision and then slowly working its way up until he was blind in this right eye. Pt had surgery and got out of the hospital. Pt states that he still can't see great out of his right eye. He says it's blurry and darker, but he can see.         Review of Systems:  Review of Systems   All other systems reviewed and are negative.      Past Medical History:   Past Medical History:   Diagnosis Date    Congestive heart failure (HCC)     Diabetes (HCC)     Hypertension     Myocardial infarct (HCC)     Neuropathy     Renal disorder     Mass on kidney, following with kidney Dr to monitor yearly    Sleep apnea     Wears Bipap       Past Surgical History:  Past Surgical History:   Procedure Laterality Date    [place] TEMPORAL ARTERY LIGATN OR BX Right 8/11/2023    Procedure: BIOPSY, ARTERY, TEMPORAL;  Surgeon: Catherine Grimes M.D.;  Location: SURGERY [facility];  Service: General    CORONARY ARTERY BYPASS, 3         Current Outpatient Medications:  Current Outpatient Medications   Medication Sig Dispense Refill    PREDNISONE PO Take 80 mL by mouth.      omeprazole (PRILOSEC) 20 MG Tablet Delayed Response delayed-release tablet Take 1 Tablet by mouth 2 times a day. 60 Tablet 1    Continuous Blood Gluc  Sensor (FREESTYLE BERKLEY 2 SENSOR) [facility]       predniSONE (DELTASONE) 20 MG Tab Take 4 tablets orally daily and follow up with Dr. Gomez for further management 28 Tablet 1    Insulin Regular Human, Conc, 500 UNIT/ML Solution Pen-injector Inject  Units under the skin 3 times a day before meals. Per sliding scale      Blood Glucose Monitoring Suppl (BLOOD GLUCOSE MONITOR SYSTEM) w/Device Kit Test blood sugar as recommended by provider. One Touch Verio Flex blood glucose monitoring kit. 1 Kit 0    glucose blood strip Use one One Touch Verio Flex strip to test blood sugar three times daily before meals. 100 Strip 0    Lancets Use one One Touch Verio Flex lancet to test blood sugar three times daily before meals. 100 Each 0    Alcohol Swabs Wipe site with prep pad prior to injection. 100 Each 0    aspirin (ASPIRIN 81) 81 MG EC tablet Take 81 mg by mouth every day.      carvedilol (COREG) 25 MG Tab Take 25 mg by mouth 2 times a day.      olmesartan (BENICAR) 20 MG Tab Take 20 mg by mouth every evening.      spironolactone (ALDACTONE) 25 MG Tab Take 37.5 mg by mouth every day. 37.5mg = 1 and 1/2 tab      rosuvastatin (CRESTOR) 5 MG Tab Take 5 mg by mouth every evening.      Cyanocobalamin (VITAMIN B 12 PO) Take 1 Tablet by mouth every day.      VITAMIN D PO Take 1 Tablet by mouth every day.      MAGNESIUM PO Take 1 Tablet by mouth every day.      NON SPECIFIED Take 1 Tablet by mouth every day. Emu- helps with inflamation       No current facility-administered medications for this visit.       Allergies:  Allergies   Allergen Reactions    Farxiga [Dapagliflozin] Unspecified     Ketoacidosis        Family History:  No family history on file.    Social History:  Social History     Socioeconomic History    Marital status:      Spouse name: Not on file    Number of children: Not on file    Years of education: Not on file    Highest education level: Not on file   Occupational History    Not on file   Tobacco Use     Smoking status: Former     Types: Cigars     Quit date:      Years since quittin.6    Smokeless tobacco: Never   Vaping Use    Vaping Use: Never used   Substance and Sexual Activity    Alcohol use: Yes     Alcohol/week: 0.6 oz     Types: 1 Glasses of wine per week     Comment: 1-2 glasses with dinner    Drug use: Not Currently    Sexual activity: Not on file   Other Topics Concern    Not on file   Social History Narrative    Not on file     Social Determinants of Health     Financial Resource Strain: Not on file   Food Insecurity: Not on file   Transportation Needs: Not on file   Physical Activity: Not on file   Stress: Not on file   Social Connections: Not on file   Intimate Partner Violence: Not on file   Housing Stability: Not on file          Physical Exam:  Physical Exam    Oriented x 3  Weight/BMI: There is no height or weight on file to calculate BMI.  There were no vitals taken for this visit.    Base Eye Exam       Visual Acuity (Snellen - Linear)         Right Left    Dist cc 20/40 20/25 -2      Correction: Glasses              Tonometry (i-care, 7:43 AM)         Right Left    Pressure 13 14              Pupils         Pupils    Right PERRL    Left PERRL              Visual Fields         Right Left     Full Full              Extraocular Movement         Right Left     Full, Ortho Full, Ortho                  Additional Tests       Color         Right Left    Ishihara 0/9 1/9              Stereo       Fly: -    Animals: 0/3    Circles: 0/9                  Strabismus Exam       Correction: sc    Observations: Ortho      Distance Near Near +3DS N Bifocals                 23 - 115 - 23       Slit Lamp and Fundus Exam       External Exam         Right Left    External Normal Normal              Slit Lamp Exam         Right Left    Lids/Lashes Normal Normal    Conjunctiva/Sclera White and quiet White and quiet    Cornea Clear Clear    Anterior Chamber Deep and quiet Deep and quiet    Iris Round and  reactive Round and reactive    Lens Clear Clear    Vitreous Normal Normal              Fundus Exam         Right Left    Disc Normal Normal    C/D Ratio 0.0 0.0    Macula Normal Normal    Vessels Vascular attenuation Vascular attenuation    Periphery Laser scars Laser scars                  Refraction       Wearing Rx         Sphere Cylinder Axis Add    Right -1.00 +1.50 006 +1.00    Left -0.50 +1.00 162 +1.75      Age: 1yrs    Type: PAL              Manifest Refraction (Auto)         Sphere Cylinder Axis    Right -0.75 +1.25 175    Left -0.25 +0.75 165                    Pertinent Lab/Test/Imaging Review:      Assessment and Plan:     Type 2 diabetes mellitus, with long-term current use of insulin ([facility])  8/14/2023-preproliferative diabetic retinopathy.  Status post bilateral panretinal photocoagulation by [facility].    Optic neuritis  8/14/2023-has slowly progressive vision loss from July 27, 2023 this was over 10 days.  Presented to [facility] where his vision was done to hand motions.  Was admitted to [facility] for work-up including MRI scan that was nondiagnostic for any optic nerve abnormality.  He did receive 4 days of high-dose intravenous steroids.  Because of headaches he also underwent a temporal artery biopsy.  He did undergo a lumbar puncture test but there were numerous red blood cells.  His Mog was negative.  He also had a CT angiogram that was negative. On exam bilateral optic nerve heads appear healthy.  OCT neurofibrillary thickness is 98 OD 88 OS.  Therefore this all suggests a possible retrobulbar optic neuritis.  However his MRI scan was nondiagnostic.   He currently is on 80 mg of prednisone today and his visual acuity has improved to 20/40.  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 Davis in [place] and was treated with IVIG.  This is because of lower extremity weakness and numbness.  He was then evaluated at [place] who did not not confirm the  diagnosis.  However because he has another event consistent with possible demyelinating disease, MS is still in the differential and the oligoclonal bans are pending long with further testing including aqua porn 4 antibody, temporal artery biopsy results, and other laboratories.  At this time we will slowly go down on the prednisone 10 mg/day and reevaluate next week.  He was referred to neurology as an outpatient. I discussed that if on the steroid taper his vision starts to deteriorate then we will need to increase his prednisone possibly re-admitting him for another round of intravenous.     60 minutes total time spent.  This included extensive review of notes from [facility], extensive review of notes in epic on recent admission including MRI images, counseling patient about findings, formulating note in epic    Ryne Gomez M.D.

## 2023-08-15 ENCOUNTER — TELEPHONE (OUTPATIENT)
Dept: HEALTH INFORMATION MANAGEMENT | Facility: OTHER | Age: 58
End: 2023-08-15
Payer: COMMERCIAL

## 2023-08-16 LAB
BACTERIA CSF CULT: NORMAL
GRAM STN SPEC: NORMAL
SIGNIFICANT IND 70042: NORMAL
SITE SITE: NORMAL
SOURCE SOURCE: NORMAL
T PALLIDUM AB CSF QL IF: NON REACTIVE

## 2023-08-17 LAB
OLIGOCLONAL BANDS CSF ELPH-IMP: NORMAL
OLIGOCLONAL BANDS CSF IEF: 0 BANDS (ref 0–1)
OLIGOCLONAL BANDS.IT SER+CSF QL: NEGATIVE

## 2023-08-18 LAB
ALBUMIN CSF ELPH-MCNC: 39.5 MG/DL (ref 8.4–34.2)
ALPHA1 GLOB CSF ELPH-MCNC: 3.7 MG/DL (ref 0–3.1)
ALPHA2 GLOB CSF ELPH-MCNC: 8.3 MG/DL (ref 0–5.4)
B-GLOBULIN CSF ELPH-MCNC: 14.2 MG/DL (ref 0–8.1)
GAMMA GLOB CSF ELPH-MCNC: 7.8 MG/DL (ref 0–5.4)
PREALB CSF ELPH-MCNC: 2.3 MG/DL (ref 0–3.1)
PROT CSF-MCNC: 75.8 MG/DL (ref 15–45)

## 2023-08-21 ENCOUNTER — OFFICE VISIT (OUTPATIENT)
Dept: OPHTHALMOLOGY | Facility: MEDICAL CENTER | Age: 58
End: 2023-08-21
Payer: COMMERCIAL

## 2023-08-21 DIAGNOSIS — H46.9 OPTIC NEURITIS: ICD-10-CM

## 2023-08-21 DIAGNOSIS — Z79.4 TYPE 2 DIABETES MELLITUS WITH HYPERGLYCEMIA, WITH LONG-TERM CURRENT USE OF INSULIN (HCC): ICD-10-CM

## 2023-08-21 DIAGNOSIS — E11.65 TYPE 2 DIABETES MELLITUS WITH HYPERGLYCEMIA, WITH LONG-TERM CURRENT USE OF INSULIN (HCC): ICD-10-CM

## 2023-08-21 LAB — PATHOLOGY CONSULT NOTE: NORMAL

## 2023-08-21 PROCEDURE — 92250 FUNDUS PHOTOGRAPHY W/I&R: CPT | Performed by: OPHTHALMOLOGY

## 2023-08-21 PROCEDURE — 99214 OFFICE O/P EST MOD 30 MIN: CPT | Mod: 25 | Performed by: OPHTHALMOLOGY

## 2023-08-21 ASSESSMENT — EXTERNAL EXAM - LEFT EYE: OS_EXAM: NORMAL

## 2023-08-21 ASSESSMENT — CONF VISUAL FIELD
OS_NORMAL: 1
OS_INFERIOR_NASAL_RESTRICTION: 0
OD_SUPERIOR_TEMPORAL_RESTRICTION: 0
OS_SUPERIOR_TEMPORAL_RESTRICTION: 0
OD_INFERIOR_TEMPORAL_RESTRICTION: 0
OD_INFERIOR_NASAL_RESTRICTION: 0
OS_INFERIOR_TEMPORAL_RESTRICTION: 0
OS_SUPERIOR_NASAL_RESTRICTION: 0
OD_SUPERIOR_NASAL_RESTRICTION: 0
OD_NORMAL: 1

## 2023-08-21 ASSESSMENT — REFRACTION_WEARINGRX
OS_ADD: +1.75
OD_AXIS: 001
SPECS_TYPE: TRIFOCAL
OS_SPHERE: -0.50
OS_AXIS: 169
OD_SPHERE: -1.00
OD_CYLINDER: +1.25
OS_CYLINDER: +1.00
OD_ADD: +1.75

## 2023-08-21 ASSESSMENT — REFRACTION_MANIFEST
OD_SPHERE: -0.75
OS_AXIS: 163
OS_SPHERE: -0.25
METHOD_AUTOREFRACTION: 1
OS_CYLINDER: +0.75
OD_CYLINDER: +1.25
OD_AXIS: 177

## 2023-08-21 ASSESSMENT — TONOMETRY
OD_IOP_MMHG: 24
OS_IOP_MMHG: 17

## 2023-08-21 ASSESSMENT — VISUAL ACUITY
OS_CC: 20/25
METHOD: SNELLEN - LINEAR
OS_CC: J1
CORRECTION_TYPE: GLASSES
OD_CC: 20/25
OD_CC: J1

## 2023-08-21 ASSESSMENT — CUP TO DISC RATIO
OS_RATIO: 0.0
OD_RATIO: 0.0

## 2023-08-21 ASSESSMENT — EXTERNAL EXAM - RIGHT EYE: OD_EXAM: NORMAL

## 2023-08-21 ASSESSMENT — SLIT LAMP EXAM - LIDS
COMMENTS: NORMAL
COMMENTS: NORMAL

## 2023-08-21 ASSESSMENT — ENCOUNTER SYMPTOMS: BLURRED VISION: 1

## 2023-08-21 NOTE — PROGRESS NOTES
Peds/Neuro Ophthalmology:   Ryne Gomez M.D.    Date & Time note created:    8/21/2023   11:07 AM     Referring MD / APRN:  Mani Hendrix D.O., No att. providers found    Patient ID:  Name:             Ace Curry   YOB: 1965  Age:                 58 y.o.  male   MRN:               [id number]    Chief Complaint/Reason for Visit:     Other (Optic neuritis)      History of Present Illness:    Ace Curry is a 58 y.o. male   Follow up for optic neuritis.Prednisone 10mg gd.Vision improving.No double vision.        Review of Systems:  Review of Systems   Eyes:  Positive for blurred vision.   All other systems reviewed and are negative.      Past Medical History:   Past Medical History:   Diagnosis Date    Congestive heart failure (HCC)     Diabetes (HCC)     Hypertension     Myocardial infarct (HCC)     Neuropathy     Renal disorder     Mass on kidney, following with kidney Dr to monitor yearly    Sleep apnea     Wears Bipap       Past Surgical History:  Past Surgical History:   Procedure Laterality Date    [place] TEMPORAL ARTERY LIGATN OR BX Right 8/11/2023    Procedure: BIOPSY, ARTERY, TEMPORAL;  Surgeon: Catherine Grimes M.D.;  Location: SURGERY [facility];  Service: General    CORONARY ARTERY BYPASS, 3         Current Outpatient Medications:  Current Outpatient Medications   Medication Sig Dispense Refill    Continuous Blood Gluc Sensor (FREESTYLE BERKLEY 2 SENSOR) Misc       omeprazole (PRILOSEC) 20 MG delayed-release capsule Take 1 Capsule by mouth 2 times a day. 60 Capsule 1    predniSONE (DELTASONE) 20 MG Tab Take 4 tablets orally daily and follow up with Dr. Gomez for further management (Patient taking differently: Take 10 mg by mouth every day. Take 4 tablets orally daily and follow up with Dr. Gomez for further management) 28 Tablet 1    Insulin Regular Human, Conc, 500 UNIT/ML Solution Pen-injector Inject  Units under the skin 3 times a day  before meals. Per sliding scale      Blood Glucose Monitoring Suppl (BLOOD GLUCOSE MONITOR SYSTEM) w/Device Kit Test blood sugar as recommended by provider. One Touch Verio Flex blood glucose monitoring kit. 1 Kit 0    glucose blood strip Use one One Touch Verio Flex strip to test blood sugar three times daily before meals. 100 Strip 0    Lancets Use one One Touch Verio Flex lancet to test blood sugar three times daily before meals. 100 Each 0    Alcohol Swabs Wipe site with prep pad prior to injection. 100 Each 0    aspirin (ASPIRIN 81) 81 MG EC tablet Take 81 mg by mouth every day.      carvedilol (COREG) 25 MG Tab Take 25 mg by mouth 2 times a day.      olmesartan (BENICAR) 20 MG Tab Take 20 mg by mouth every evening.      spironolactone (ALDACTONE) 25 MG Tab Take 37.5 mg by mouth every day. 37.5mg = 1 and 1/2 tab      rosuvastatin (CRESTOR) 5 MG Tab Take 5 mg by mouth every evening.      Cyanocobalamin (VITAMIN B 12 PO) Take 1 Tablet by mouth every day.      VITAMIN D PO Take 1 Tablet by mouth every day.      MAGNESIUM PO Take 1 Tablet by mouth every day.      NON SPECIFIED Take 1 Tablet by mouth every day. Emu- helps with inflamation      PREDNISONE PO Take 80 mL by mouth. (Patient not taking: Reported on 2023)       No current facility-administered medications for this visit.       Allergies:  Allergies   Allergen Reactions    Farxiga [Dapagliflozin] Unspecified     Ketoacidosis        Family History:  Family History   Problem Relation Age of Onset    Diabetes Mother     Glasses Mother     Diabetes Father     Glasses Father        Social History:  Social History     Socioeconomic History    Marital status:      Spouse name: Not on file    Number of children: Not on file    Years of education: Not on file    Highest education level: Not on file   Occupational History    Not on file   Tobacco Use    Smoking status: Former     Types: Cigars     Quit date:      Years since quittin.6     Smokeless tobacco: Never   Vaping Use    Vaping Use: Never used   Substance and Sexual Activity    Alcohol use: Yes     Alcohol/week: 0.6 oz     Types: 1 Glasses of wine per week     Comment: 1-2 glasses with dinner    Drug use: Not Currently    Sexual activity: Not on file   Other Topics Concern    Not on file   Social History Narrative    Retired     Social Determinants of Health     Financial Resource Strain: Not on file   Food Insecurity: Not on file   Transportation Needs: Not on file   Physical Activity: Not on file   Stress: Not on file   Social Connections: Not on file   Intimate Partner Violence: Not on file   Housing Stability: Not on file          Physical Exam:  Physical Exam    Oriented x 3  Weight/BMI: There is no height or weight on file to calculate BMI.  There were no vitals taken for this visit.    Base Eye Exam       Visual Acuity (Snellen - Linear)         Right Left    Dist cc 20/25 20/25    Near cc J1 J1      Correction: Glasses              Tonometry (i care, 7:40 AM)         Right Left    Pressure 24 17              Pupils         Pupils    Right PERRL    Left PERRL              Visual Fields         Right Left     Full Full              Extraocular Movement         Right Left     Full, Ortho Full, Ortho              Neuro/Psych       Oriented x3: Yes    Mood/Affect: Normal                  Additional Tests       Color         Right Left    Ishihara 0/9 0/9              Stereo       Fly: -    Animals: 1/3    Circles: 1/9                  Slit Lamp and Fundus Exam       External Exam         Right Left    External Normal Normal              Slit Lamp Exam         Right Left    Lids/Lashes Normal Normal    Conjunctiva/Sclera White and quiet White and quiet    Cornea Clear Clear    Anterior Chamber Deep and quiet Deep and quiet    Iris Round and reactive Round and reactive    Lens Clear Clear    Vitreous Normal Normal              Fundus Exam         Right Left    Disc Normal Normal    C/D Ratio  0.0 0.0    Macula Normal Normal    Vessels Vascular attenuation Vascular attenuation    Periphery Laser scars Laser scars                  Refraction       Wearing Rx         Sphere Cylinder Axis Add    Right -1.00 +1.25 001 +1.75    Left -0.50 +1.00 169 +1.75      Age: 1yrs    Type: Trifocal              Manifest Refraction (Auto)         Sphere Cylinder Axis    Right -0.75 +1.25 177    Left -0.25 +0.75 163                    Pertinent Lab/Test/Imaging Review:      Assessment and Plan:     Optic neuritis  8/14/2023-has slowly progressive vision loss from July 27, 2023 this was over 10 days.  Presented to [facility] where his vision was done to hand motions.  Was admitted to [facility] for work-up including MRI scan that was nondiagnostic for any optic nerve abnormality.  He did receive 4 days of high-dose intravenous steroids.  Because of headaches he also underwent a temporal artery biopsy.  He did undergo a lumbar puncture test but there were numerous red blood cells.  His Mog was negative.  He also had a CT angiogram that was negative. On exam bilateral optic nerve heads appear healthy.  OCT neurofibrillary thickness is 98 OD 88 OS.  Therefore this all suggests a possible retrobulbar optic neuritis.  However his MRI scan was nondiagnostic.   He currently is on 80 mg of prednisone today and his visual acuity has improved to 20/40.  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 Davis in [place] and was treated with IVIG.  This is because of lower extremity weakness and numbness.  He was then evaluated at [place] who did not not confirm the diagnosis.  However because he has another event consistent with possible demyelinating disease, MS is still in the differential and the oligoclonal bans are pending long with further testing including aqua porn 4 antibody, temporal artery biopsy results, and other laboratories.  At this time we will slowly go down on the prednisone 10 mg/day and  reevaluate next week.  He was referred to neurology as an outpatient. I discussed that if on the steroid taper his vision starts to deteriorate then we will need to increase his prednisone possibly re-admitting him for another round of intravenous.   8/21/2023 -overall visual acuity has continued to be improved significantly.  Is on 10 mg/day prednisone and going to discontinue tomorrow.  His OCT neurofibrillary thickness today is at 94 OD 86 OS.  Aqua porn 4 antibody as well as MOG antibody were normal.  There were no oligoclonal bands seen on lumbar puncture.  Therefore he has had significant improvement of his optic nerve function.  Still uncertain as to whether this is steroid sensitive.  Has a appointment coming up with neurology.  Discussed importance to notify us immediately or present to the emergency room if his vision begins to deteriorate following discontinuation of the steroids.     Type 2 diabetes mellitus, with long-term current use of insulin ([facility])  8/14/2023-preproliferative diabetic retinopathy.  Status post bilateral panretinal photocoagulation by [facility].  8/21/2023-stable        Ryne Gomez M.D.

## 2023-08-21 NOTE — ASSESSMENT & PLAN NOTE
8/14/2023-preproliferative diabetic retinopathy.  Status post bilateral panretinal photocoagulation by [facility].  8/21/2023-stable

## 2023-08-21 NOTE — ASSESSMENT & PLAN NOTE
8/14/2023-has slowly progressive vision loss from July 27, 2023 this was over 10 days.  Presented to [facility] where his vision was done to hand motions.  Was admitted to [facility] for work-up including MRI scan that was nondiagnostic for any optic nerve abnormality.  He did receive 4 days of high-dose intravenous steroids.  Because of headaches he also underwent a temporal artery biopsy.  He did undergo a lumbar puncture test but there were numerous red blood cells.  His Mog was negative.  He also had a CT angiogram that was negative. On exam bilateral optic nerve heads appear healthy.  OCT neurofibrillary thickness is 98 OD 88 OS.  Therefore this all suggests a possible retrobulbar optic neuritis.  However his MRI scan was nondiagnostic.   He currently is on 80 mg of prednisone today and his visual acuity has improved to 20/40.  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 Davis in [place] and was treated with IVIG.  This is because of lower extremity weakness and numbness.  He was then evaluated at [place] who did not not confirm the diagnosis.  However because he has another event consistent with possible demyelinating disease, MS is still in the differential and the oligoclonal bans are pending long with further testing including aqua porn 4 antibody, temporal artery biopsy results, and other laboratories.  At this time we will slowly go down on the prednisone 10 mg/day and reevaluate next week.  He was referred to neurology as an outpatient. I discussed that if on the steroid taper his vision starts to deteriorate then we will need to increase his prednisone possibly re-admitting him for another round of intravenous.   8/21/2023 -overall visual acuity has continued to be improved significantly.  Is on 10 mg/day prednisone and going to discontinue tomorrow.  His OCT neurofibrillary thickness today is at 94 OD 86 OS.  Aqua porn 4 antibody as well as MOG antibody were normal.   There were no oligoclonal bands seen on lumbar puncture.  Therefore he has had significant improvement of his optic nerve function.  Still uncertain as to whether this is steroid sensitive.  Has a appointment coming up with neurology.  Discussed importance to notify us immediately or present to the emergency room if his vision begins to deteriorate following discontinuation of the steroids.

## 2023-08-21 NOTE — PROCEDURES
Discs sharp OU. Preproliferative diabetic retinopathy with peripheral laser photocoagulation scars

## 2023-08-22 NOTE — DOCUMENTATION QUERY
"                                                                         [facility]                                                                       Query Response Note      PATIENT:               HOME NELSON  ACCT #:                  [id number]  MRN:                     [id number]  :                      1965  ADMIT DATE:       2023 1:09 PM  DISCH DATE:        2023 4:50 PM  RESPONDING  PROVIDER #:        [id number]           QUERY TEXT:    Pt has documented \"possible giant cell arteritis\".  Please clarify status of this condition:    NOTE:  If an appropriate response is not listed below, please respond with a new note.    Thank you,  Jennifer Maldonado CCS, CCDS, CDIP  [email or web address]       The patient's Clinical Indicators include:  Discharge Summary 23 - ??possible giant cell arteritis. Here, ESR was 21 and CRP 1.61. He was started on Solu-Medrol 1 g daily with improvement of his vision?.Temporal artery ultrasound was negative for stenosis or halo effect. Vascular surgery performed temporal artery biopsy and pathology is pending.?    Pathology Report 23 - ?FINAL DIAGNOSIS:   A. Right temporal artery:          Vessel showing very focal loss of the internal elastic lamina           without inflammatory changes or scar.     Comment: The findings are not diagnostic of temporal arteritis, however   treated/healed temporal arteritis may be considered in the proper   clinical context.?    RISK FACTORS:  57yo M w/PMH of diabetes, HTN, HLD,CAD, MI admitted for 2 weeks progressive right eye vision loss    TREATMENT:  Patient started on Solu-Medrol 1 g daily for 3 doses,CTA, temporal artery duplex, Follow-up on sed rate and CRP, Lumbar puncture, discharged on high dose prednisone  Options provided:   -- Giant cell arteritis is present or unable to be ruled out   -- Giant cell arteritis is  ruled out   -- Unable to determine      Query created by: Jennifer Maldonado on 2023 3:37 " PM    RESPONSE TEXT:    Unable to determine          Electronically signed by:  SHALA RIDDLE MD 8/22/2023 10:04 AM

## 2023-10-09 ENCOUNTER — OFFICE VISIT (OUTPATIENT)
Dept: OPHTHALMOLOGY | Facility: MEDICAL CENTER | Age: 58
End: 2023-10-09
Payer: COMMERCIAL

## 2023-10-09 DIAGNOSIS — Z79.4 TYPE 2 DIABETES MELLITUS WITH HYPERGLYCEMIA, WITH LONG-TERM CURRENT USE OF INSULIN (HCC): ICD-10-CM

## 2023-10-09 DIAGNOSIS — E11.65 TYPE 2 DIABETES MELLITUS WITH HYPERGLYCEMIA, WITH LONG-TERM CURRENT USE OF INSULIN (HCC): ICD-10-CM

## 2023-10-09 DIAGNOSIS — H46.9 OPTIC NEURITIS: ICD-10-CM

## 2023-10-09 PROCEDURE — 99214 OFFICE O/P EST MOD 30 MIN: CPT | Mod: 25 | Performed by: OPHTHALMOLOGY

## 2023-10-09 PROCEDURE — 92250 FUNDUS PHOTOGRAPHY W/I&R: CPT | Performed by: OPHTHALMOLOGY

## 2023-10-09 RX ORDER — FUROSEMIDE 40 MG/1
40 TABLET ORAL DAILY
COMMUNITY

## 2023-10-09 ASSESSMENT — REFRACTION_MANIFEST
OD_AXIS: 177
OD_CYLINDER: +1.50
OS_SPHERE: +0.25
METHOD_AUTOREFRACTION: 1
OS_AXIS: 162
OD_SPHERE: -0.75
OS_CYLINDER: +0.75

## 2023-10-09 ASSESSMENT — VISUAL ACUITY
METHOD: SNELLEN - LINEAR
OS_CC: J1
CORRECTION_TYPE: GLASSES
OD_CC: 20/25
OS_CC: 20/30-1
OD_CC: J1

## 2023-10-09 ASSESSMENT — REFRACTION_WEARINGRX
OS_CYLINDER: +1.00
SPECS_TYPE: TRIFOCAL
OD_AXIS: 001
OD_SPHERE: -1.00
OS_AXIS: 166
OS_ADD: +1.75
OS_SPHERE: -0.50
OD_ADD: +1.75
OD_CYLINDER: +1.25

## 2023-10-09 ASSESSMENT — EXTERNAL EXAM - RIGHT EYE: OD_EXAM: NORMAL

## 2023-10-09 ASSESSMENT — TONOMETRY
OD_IOP_MMHG: 20
OS_IOP_MMHG: 20

## 2023-10-09 ASSESSMENT — SLIT LAMP EXAM - LIDS
COMMENTS: NORMAL
COMMENTS: NORMAL

## 2023-10-09 ASSESSMENT — CUP TO DISC RATIO
OD_RATIO: 0.0
OS_RATIO: 0.0

## 2023-10-09 ASSESSMENT — ENCOUNTER SYMPTOMS: BLURRED VISION: 1

## 2023-10-09 ASSESSMENT — EXTERNAL EXAM - LEFT EYE: OS_EXAM: NORMAL

## 2023-10-09 NOTE — ASSESSMENT & PLAN NOTE
8/14/2023-preproliferative diabetic retinopathy.  Status post bilateral panretinal photocoagulation by [facility].  8/21/2023-stable  10/9/2023-continues to be followed by [facility]

## 2023-10-09 NOTE — PROGRESS NOTES
Peds/Neuro Ophthalmology:   Ryne Gomez M.D.    Date & Time note created:    10/9/2023   11:08 AM     Referring MD / APRN:  Mani Hendrix D.O., No att. providers found    Patient ID:  Name:             Ace Curry   YOB: 1965  Age:                 58 y.o.  male   MRN:               [id number]    Chief Complaint/Reason for Visit:     Other (Optic neuritis)      History of Present Illness:    Ace Curry is a 58 y.o. male   6 week follow up for optic neuritis and diabetes.Patient feels vision is slightly worse since last visit for mostly near vision,patient feels he may need new RX.        Review of Systems:  Review of Systems   Eyes:  Positive for blurred vision.   All other systems reviewed and are negative.      Past Medical History:   Past Medical History:   Diagnosis Date    Congestive heart failure (HCC)     Diabetes (HCC)     Hypertension     Myocardial infarct (HCC)     Neuropathy     Renal disorder     Mass on kidney, following with kidney Dr to monitor yearly    Sleep apnea     Wears Bipap       Past Surgical History:  Past Surgical History:   Procedure Laterality Date    NV TEMPORAL ARTERY LIGATN OR BX Right 8/11/2023    Procedure: BIOPSY, ARTERY, TEMPORAL;  Surgeon: Catherine Grimes M.D.;  Location: SURGERY [facility];  Service: General    CORONARY ARTERY BYPASS, 3         Current Outpatient Medications:  Current Outpatient Medications   Medication Sig Dispense Refill    furosemide (LASIX) 40 MG Tab Take 40 mg by mouth every day.      Continuous Blood Gluc Sensor (FREESTYLE BERKLEY 2 SENSOR) Misc       Insulin Regular Human, Conc, 500 UNIT/ML Solution Pen-injector Inject  Units under the skin 3 times a day before meals. Per sliding scale      Blood Glucose Monitoring Suppl (BLOOD GLUCOSE MONITOR SYSTEM) w/Device Kit Test blood sugar as recommended by provider. One Touch Verio Flex blood glucose monitoring kit. 1 Kit 0    glucose blood strip  Use one One Touch Verio Flex strip to test blood sugar three times daily before meals. 100 Strip 0    Lancets Use one One Touch Verio Flex lancet to test blood sugar three times daily before meals. 100 Each 0    Alcohol Swabs Wipe site with prep pad prior to injection. 100 Each 0    aspirin (ASPIRIN 81) 81 MG EC tablet Take 81 mg by mouth every day.      carvedilol (COREG) 25 MG Tab Take 25 mg by mouth 2 times a day.      olmesartan (BENICAR) 20 MG Tab Take 20 mg by mouth every evening.      rosuvastatin (CRESTOR) 5 MG Tab Take 5 mg by mouth every evening.      Cyanocobalamin (VITAMIN B 12 PO) Take 1 Tablet by mouth every day.      VITAMIN D PO Take 1 Tablet by mouth every day.      MAGNESIUM PO Take 1 Tablet by mouth every day.      NON SPECIFIED Take 1 Tablet by mouth every day. Emu- helps with inflamation      PREDNISONE PO Take 80 mL by mouth. (Patient not taking: Reported on 8/21/2023)      omeprazole (PRILOSEC) 20 MG delayed-release capsule Take 1 Capsule by mouth 2 times a day. (Patient not taking: Reported on 10/9/2023) 60 Capsule 1    predniSONE (DELTASONE) 20 MG Tab Take 4 tablets orally daily and follow up with Dr. Gomez for further management (Patient not taking: Reported on 10/9/2023) 28 Tablet 1    spironolactone (ALDACTONE) 25 MG Tab Take 37.5 mg by mouth every day. 37.5mg = 1 and 1/2 tab (Patient not taking: Reported on 10/9/2023)       No current facility-administered medications for this visit.       Allergies:  Allergies   Allergen Reactions    Farxiga [Dapagliflozin] Unspecified     Ketoacidosis        Family History:  Family History   Problem Relation Age of Onset    Diabetes Mother     Glasses Mother     Diabetes Father     Glasses Father        Social History:  Social History     Socioeconomic History    Marital status:      Spouse name: Not on file    Number of children: Not on file    Years of education: Not on file    Highest education level: Not on file   Occupational History     Not on file   Tobacco Use    Smoking status: Former     Types: Cigars     Quit date: 2017     Years since quittin.7    Smokeless tobacco: Never   Vaping Use    Vaping Use: Never used   Substance and Sexual Activity    Alcohol use: Yes     Alcohol/week: 0.6 oz     Types: 1 Glasses of wine per week     Comment: 1-2 glasses with dinner    Drug use: Not Currently    Sexual activity: Not on file   Other Topics Concern    Not on file   Social History Narrative    Retired     Social Determinants of Health     Financial Resource Strain: Not on file   Food Insecurity: Not on file   Transportation Needs: Not on file   Physical Activity: Not on file   Stress: Not on file   Social Connections: Not on file   Intimate Partner Violence: Not on file   Housing Stability: Not on file          Physical Exam:  Physical Exam    Oriented x 3  Weight/BMI: There is no height or weight on file to calculate BMI.  There were no vitals taken for this visit.    Base Eye Exam       Visual Acuity (Snellen - Linear)         Right Left    Dist cc 20/25 20/30-1    Near cc J1 J1      Correction: Glasses              Tonometry (i care, 10:44 AM)         Right Left    Pressure 20 20              Pupils         Pupils    Right PERRL    Left PERRL              Neuro/Psych       Oriented x3: Yes    Mood/Affect: Normal                  Additional Tests       Color         Right Left    Ishihara 0/9 0/9                  Strabismus Exam       Correction: sc    Observations: Ortho      Distance Near Near +3DS N Bifocals                 23 - 115 - 23       Slit Lamp and Fundus Exam       External Exam         Right Left    External Normal Normal              Slit Lamp Exam         Right Left    Lids/Lashes Normal Normal    Conjunctiva/Sclera White and quiet White and quiet    Cornea Clear Clear    Anterior Chamber Deep and quiet Deep and quiet    Iris Round and reactive Round and reactive    Lens Clear Clear    Vitreous Normal Normal              Fundus  Exam         Right Left    Disc Normal Normal    C/D Ratio 0.0 0.0    Macula Normal Normal    Vessels Vascular attenuation Vascular attenuation    Periphery Laser scars Laser scars                  Refraction       Wearing Rx         Sphere Cylinder Axis Add    Right -1.00 +1.25 001 +1.75    Left -0.50 +1.00 166 +1.75      Age: 1yrs    Type: Trifocal              Manifest Refraction (Auto)         Sphere Cylinder Axis    Right -0.75 +1.50 177    Left +0.25 +0.75 162                    Pertinent Lab/Test/Imaging Review:      Assessment and Plan:     Optic neuritis  8/14/2023-has slowly progressive vision loss from July 27, 2023 this was over 10 days.  Presented to [facility] where his vision was done to hand motions.  Was admitted to [facility] for work-up including MRI scan that was nondiagnostic for any optic nerve abnormality.  He did receive 4 days of high-dose intravenous steroids.  Because of headaches he also underwent a temporal artery biopsy.  He did undergo a lumbar puncture test but there were numerous red blood cells.  His Mog was negative.  He also had a CT angiogram that was negative. On exam bilateral optic nerve heads appear healthy.  OCT neurofibrillary thickness is 98 OD 88 OS.  Therefore this all suggests a possible retrobulbar optic neuritis.  However his MRI scan was nondiagnostic.   He currently is on 80 mg of prednisone today and his visual acuity has improved to 20/40.  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 Davis in [place] and was treated with IVIG.  This is because of lower extremity weakness and numbness.  He was then evaluated at [facility] who did not not confirm the diagnosis.  However because he has another event consistent with possible demyelinating disease, MS is still in the differential and the oligoclonal bans are pending long with further testing including aqua porn 4 antibody, temporal artery biopsy results, and other laboratories.  At this  time we will slowly go down on the prednisone 10 mg/day and reevaluate next week.  He was referred to neurology as an outpatient. I discussed that if on the steroid taper his vision starts to deteriorate then we will need to increase his prednisone possibly re-admitting him for another round of intravenous.   8/21/2023 -overall visual acuity has continued to be improved significantly.  Is on 10 mg/day prednisone and going to discontinue tomorrow.  His OCT neurofibrillary thickness today is at 94 OD 86 OS.  Aqua porn 4 antibody as well as MOG antibody were normal.  There were no oligoclonal bands seen on lumbar puncture.  Therefore he has had significant improvement of his optic nerve function.  Still uncertain as to whether this is steroid sensitive.  Has a appointment coming up with neurology.  Discussed importance to notify us immediately or present to the emergency room if his vision begins to deteriorate following discontinuation of the steroids.   10/9/2023 -overall visual has returned essentially normal.  No optic nerve swelling today.  OCT nerve fiber thickness 85 OD 91 OS.  OCT does demonstrate some decrease in ganglion cell suggestive that this might be a underlying demyelinative process.  However at this time discussed monitoring.  If second episode will refer to neuro immunology    Type 2 diabetes mellitus, with long-term current use of insulin ([facility])  8/14/2023-preproliferative diabetic retinopathy.  Status post bilateral panretinal photocoagulation by [facility].  8/21/2023-stable  10/9/2023-continues to be followed by Liz Gomez M.D.

## 2023-10-09 NOTE — ASSESSMENT & PLAN NOTE
8/14/2023-has slowly progressive vision loss from July 27, 2023 this was over 10 days.  Presented to [facility] where his vision was done to hand motions.  Was admitted to [facility] for work-up including MRI scan that was nondiagnostic for any optic nerve abnormality.  He did receive 4 days of high-dose intravenous steroids.  Because of headaches he also underwent a temporal artery biopsy.  He did undergo a lumbar puncture test but there were numerous red blood cells.  His Mog was negative.  He also had a CT angiogram that was negative. On exam bilateral optic nerve heads appear healthy.  OCT neurofibrillary thickness is 98 OD 88 OS.  Therefore this all suggests a possible retrobulbar optic neuritis.  However his MRI scan was nondiagnostic.   He currently is on 80 mg of prednisone today and his visual acuity has improved to 20/40.  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 Davis in [place] and was treated with IVIG.  This is because of lower extremity weakness and numbness.  He was then evaluated at [facility] who did not not confirm the diagnosis.  However because he has another event consistent with possible demyelinating disease, MS is still in the differential and the oligoclonal bans are pending long with further testing including aqua porn 4 antibody, temporal artery biopsy results, and other laboratories.  At this time we will slowly go down on the prednisone 10 mg/day and reevaluate next week.  He was referred to neurology as an outpatient. I discussed that if on the steroid taper his vision starts to deteriorate then we will need to increase his prednisone possibly re-admitting him for another round of intravenous.   8/21/2023 -overall visual acuity has continued to be improved significantly.  Is on 10 mg/day prednisone and going to discontinue tomorrow.  His OCT neurofibrillary thickness today is at 94 OD 86 OS.  Aqua porn 4 antibody as well as MOG antibody were normal.   There were no oligoclonal bands seen on lumbar puncture.  Therefore he has had significant improvement of his optic nerve function.  Still uncertain as to whether this is steroid sensitive.  Has a appointment coming up with neurology.  Discussed importance to notify us immediately or present to the emergency room if his vision begins to deteriorate following discontinuation of the steroids.   10/9/2023 -overall visual has returned essentially normal.  No optic nerve swelling today.  OCT nerve fiber thickness 85 OD 91 OS.  OCT does demonstrate some decrease in ganglion cell suggestive that this might be a underlying demyelinative process.  However at this time discussed monitoring.  If second episode will refer to neuro immunology
